# Patient Record
Sex: FEMALE | Race: WHITE
[De-identification: names, ages, dates, MRNs, and addresses within clinical notes are randomized per-mention and may not be internally consistent; named-entity substitution may affect disease eponyms.]

---

## 2020-08-05 ENCOUNTER — HOSPITAL ENCOUNTER (EMERGENCY)
Dept: HOSPITAL 46 - ED | Age: 70
Discharge: HOME | End: 2020-08-05
Payer: MEDICARE

## 2020-08-05 VITALS — HEIGHT: 68 IN | BODY MASS INDEX: 27.28 KG/M2 | WEIGHT: 180.01 LBS
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
## 2020-08-05 DIAGNOSIS — S81.011A: Primary | ICD-10-CM

## 2020-08-05 DIAGNOSIS — E10.9: ICD-10-CM

## 2020-08-05 DIAGNOSIS — Z23: ICD-10-CM

## 2020-08-05 DIAGNOSIS — Z88.8: ICD-10-CM

## 2020-08-05 DIAGNOSIS — E03.9: ICD-10-CM

## 2020-08-05 DIAGNOSIS — Z79.899: ICD-10-CM

## 2020-08-05 PROCEDURE — 0YQFXZZ REPAIR RIGHT KNEE REGION, EXTERNAL APPROACH: ICD-10-PCS | Performed by: EMERGENCY MEDICINE

## 2020-08-19 ENCOUNTER — HOSPITAL ENCOUNTER (EMERGENCY)
Dept: HOSPITAL 46 - ED | Age: 70
Discharge: HOME | End: 2020-08-19
Payer: MEDICARE

## 2020-08-19 VITALS — WEIGHT: 180.01 LBS | HEIGHT: 68 IN | BODY MASS INDEX: 27.28 KG/M2

## 2020-08-19 DIAGNOSIS — Z48.01: Primary | ICD-10-CM

## 2020-08-19 NOTE — XMS
PreManage Notification: INÉS HAGER MRN:[id number]
 
Security Information
 
Security Events
No recent Security Events currently on file
 
 
 
CRITERIA MET
------------
- [facility] - 2 Visits in 30 Days
 
 
CARE PROVIDERS
There are no care providers on record at this time.
 
[facility] has no Care Guidelines for this patient.
 
[facility]AL[facility] VISIT COUNT (12 MO.)
-------------------------------------------------------------------------------------
2 [facility]
-------------------------------------------------------------------------------------
TOTAL 2
-------------------------------------------------------------------------------------
NOTE: Visits indicate total known visits.
 
ED/C VISIT TRACKING (12 MO.)
-------------------------------------------------------------------------------------
08/19/2020 10:53
[facility] [place]
 
TYPE: Emergency
 
COMPLAINT:
- SUTURE REMOVAL
-------------------------------------------------------------------------------------
08/05/2020 07:57
[facility] [place]
 
TYPE: Emergency
 
COMPLAINT:
- FALL, RIGHT KNEE LAC
 
DIAGNOSES:
- Allergy status to other drugs, medicaments and biological sub
- Type 1 diabetes mellitus without complications
- Hypothyroidism, unspecified
- Other long term (current) drug therapy
- Encounter for immunization
- Laceration without foreign body, right knee, initial encounte
- Fall on same level from slipping, tripping and stumbling with
-------------------------------------------------------------------------------------
 
 
INPATIENT VISIT TRACKING (12 MO.)
No inpatient visits to display in this time frame
 
 
[email or web address]

## 2020-08-19 NOTE — XMS
Encounter Summary
  Created on: 2020
 
 Kassie Duckworth
 External Reference #: [id number]
 : 52
 Sex: Female
 
 Demographics
 
 
+-----------------------+------------------------+
| Address               | [address]         |
|                       | [place]  [address]    |
+-----------------------+------------------------+
| Home Phone            | [phone]        |
+-----------------------+------------------------+
| Preferred Language    | Unknown                |
+-----------------------+------------------------+
| Marital Status        |                 |
+-----------------------+------------------------+
| Hindu Affiliation | Unknown                |
+-----------------------+------------------------+
| Race                  | White                  |
+-----------------------+------------------------+
| Ethnic Group          | Not  or  |
+-----------------------+------------------------+
 
 
 Author
 
 
+--------------+------------------------------+
| Author       | [facility] |
+--------------+------------------------------+
| Organization | [facility] |
+--------------+------------------------------+
| Address      | Unknown                      |
+--------------+------------------------------+
| Phone        | Unavailable                  |
+--------------+------------------------------+
 
 
 
 Support
 
 
+-------------+--------------+---------+-----------------+
| Name        | Relationship | Address | Phone           |
+-------------+--------------+---------+-----------------+
| Abisai Duckworth | ECON         | Unknown | [phone] |
+-------------+--------------+---------+-----------------+
 
 
 
 Care Team Providers
 
 
 
+-----------------------+------+-------------+
| Care Team Member Name | Role | Phone       |
+-----------------------+------+-------------+
 PCP  | Unavailable |
+-----------------------+------+-------------+
 
 
 
 Encounter Details
 
 
+--------+-------------+----------------------+----------------------+-------------+
| Date   | Type        | Department           | Care Team            | Description |
+--------+-------------+----------------------+----------------------+-------------+
| / | Ancillary   |   Registration  [address] |   Feliz Kumar MD  |             |
|    | Registratio |  [facility] |  [address]   |             |
|        | n           |  Rd  Mailcode: RPB07 | [facility] [address]        |             |
|        |             |   [place], [place]       | [place] [address]  |             |
|        |             | [address]           |  [phone]        |             |
|        |             | [phone]         | [phone] (Fax)   |             |
+--------+-------------+----------------------+----------------------+-------------+
 
 
 
 Social History
 
 
+----------------+-------+-----------+--------+------+
| Tobacco Use    | Types | Packs/Day | Years  | Date |
|                |       |           | Used   |      |
+----------------+-------+-----------+--------+------+
| Never Assessed |       |           |        |      |
+----------------+-------+-----------+--------+------+
 
 
 
+------------------+---------------+
| Sex Assigned at  | Date Recorded |
| Birth            |               |
+------------------+---------------+
| Not on file      |               |
+------------------+---------------+
 
 
 
+----------------+-------------+-------------+
| Job Start Date | Occupation  | Industry    |
+----------------+-------------+-------------+
| Not on file    | Not on file | Not on file |
+----------------+-------------+-------------+
 
 
 
+----------------+--------------+------------+
| Travel History | Travel Start | Travel End |
+----------------+--------------+------------+
 
 
 
+-------------------------------------+
 
| No recent travel history available. |
+-------------------------------------+
 documented as of this encounter
 
 Plan of Treatment
 Not on filedocumented as of this encounter
 
 Visit Diagnoses
 Not on filedocumented in this encounter

## 2020-08-19 NOTE — XMS
Encounter Summary
  Created on: 2020
 
 Kassie Duckworth
 External Reference #: [id number]
 : 52
 Sex: Female
 
 Demographics
 
 
+-----------------------+------------------------+
| Address               | [address]         |
|                       | [place]  [address]    |
+-----------------------+------------------------+
| Home Phone            | [phone]        |
+-----------------------+------------------------+
| Preferred Language    | Unknown                |
+-----------------------+------------------------+
| Marital Status        |                 |
+-----------------------+------------------------+
| Buddhism Affiliation | Unknown                |
+-----------------------+------------------------+
| Race                  | White                  |
+-----------------------+------------------------+
| Ethnic Group          | Not  or  |
+-----------------------+------------------------+
 
 
 Author
 
 
+--------------+------------------------------+
| Author       | [facility] |
+--------------+------------------------------+
| Organization | [facility] |
+--------------+------------------------------+
| Address      | Unknown                      |
+--------------+------------------------------+
| Phone        | Unavailable                  |
+--------------+------------------------------+
 
 
 
 Support
 
 
+-------------+--------------+---------+-----------------+
| Name        | Relationship | Address | Phone           |
+-------------+--------------+---------+-----------------+
| Abisai Duckworth | ECON         | Unknown | [phone] |
+-------------+--------------+---------+-----------------+
 
 
 
 Care Team Providers
 
 
 
+-----------------------+------+-------------+
| Care Team Member Name | Role | Phone       |
+-----------------------+------+-------------+
 PCP  | Unavailable |
+-----------------------+------+-------------+
 
 
 
 Encounter Details
 
 
+--------+-------------+-------------------+----------------------+---------------+
| Date   | Type        | Department        | Care Team            | Description   |
+--------+-------------+-------------------+----------------------+---------------+
| / | Office      |   [facility] |   Note, Dermatology  | Progress Note |
| 2005   | Visit-Trans |                   | Clinic               |               |
|        | cribed      |                   |                      |               |
+--------+-------------+-------------------+----------------------+---------------+
 
 
 
 Social History
 
 
+----------------+-------+-----------+--------+------+
| Tobacco Use    | Types | Packs/Day | Years  | Date |
|                |       |           | Used   |      |
+----------------+-------+-----------+--------+------+
| Never Assessed |       |           |        |      |
+----------------+-------+-----------+--------+------+
 
 
 
+------------------+---------------+
| Sex Assigned at  | Date Recorded |
| Birth            |               |
+------------------+---------------+
| Not on file      |               |
+------------------+---------------+
 
 
 
+----------------+-------------+-------------+
| Job Start Date | Occupation  | Industry    |
+----------------+-------------+-------------+
| Not on file    | Not on file | Not on file |
+----------------+-------------+-------------+
 
 
 
+----------------+--------------+------------+
| Travel History | Travel Start | Travel End |
+----------------+--------------+------------+
 
 
 
+-------------------------------------+
| No recent travel history available. |
+-------------------------------------+
 documented as of this encounter
 
 
 Progress Notes
 Interface, Transcription In - 2005  5:06 AM PDT
      [id number]           2005 [id number]
         [id number]  MADAN CONTE
 
 DATE OF SERVICE:                 2005
 
 SURGEON: Feliz Kumar M.D.
 SURGICAL ASSISTANT: Laura Corcoran M.D.
 RESIDENT: Eusebio Jiménez M.D.
 
 ASSESSMENT: Taken and Recorded
 
 INDICATIONS: Patient presents with primary basal cell carcinoma of the left
 nose sidewall, pretreatment lesion size .8 x .8 cm. Because of the size,
 site, aggressive pathology, poorly defined borders, and diagnosis of the
 lesion, it is felt that it is best treated with Mohs micrographic surgery.
 Alternative surgical and nonsurgical therapies were discussed with the
 patient prior to obtaining written and verbal consent for Mohs surgery
 after explanation of risks of bleeding, infection, scarring, nerve damage
 and recurrence
 
 STAGE 1: The patient was placed supine on the operating room table. The
 wound was defined and infiltrated with lidocaine with epinephrine. The area
 was then debulked. Initial excisions were made around the clinical and
 debulk markings and hemostasis was obtained by electrodessication. A
 dressing was placed. Tissue was divided into 2 specimens, which were
 mapped, color-coded at their margins, and frozen sectioning was performed.
 Microscopic tumor was found persisting in 1 of the specimens. The histology
 showed a large atypical basaloid pleomorphic cells extending into the
 papillary and reticular dermis.
 
 STAGE II: The patient was returned to the operative suite. The area of
 positivity was delineated, infiltrated with lidocaine with epinephrine, and
 excised. Tissue was divided into 1 specimens, which were again marked,
 color-coded, and frozen sectioning was performed. Hemostasis was obtained
 in the usual manner, and a dressing placed. Microscopic tumors were found
 persisting in none of the specimens.
 
 With the lesion clear of microscopic tumor, surgery was considered
 complete. Following surgery, the defect measures as follows: 1.5 x 1.2 cm.
 
 FINAL DIAGNOSIS: Primary basal cell carcinoma of the left nose sidewall.
 
 CONDITION AT TERMINATION OF THERAPY: Carcinoma removed.
 
 COMMENTS: Advancement flap closure.
 
 ______________________________________
 _______________________________________
 ___                                    _
 Feliz Kumar M.D.                      Laura Corcoran M.D.
 Professor and Chair, Dermatology        Fellow, Dermatology
                                         Eusebio Jiménez M.D.
                                         Resident, Dermatology
 
 Pursuant to federal Medicare billing regulations, I certify that I was
 present, at a minimum, during the key components of the procedure: identify
 location/lesion and method of destruction. Identify the appropriate margin,
 
 respect for anatomy and reading, and prepared histologic session.
 
 NS/cary
 D: 2005
 T: 2005  9:57 A
 
 Electronically signed by Feliz Kumar 2005 02:45:28 PM
 Electronically signed by Paul Transcription In at 2005  5:06 AM PDTdocumented i
n this encounter
 
 Plan of Treatment
 Not on filedocumented as of this encounter
 
 Visit Diagnoses
 Not on filedocumented in this encounter

## 2020-08-19 NOTE — XMS
Encounter Summary
  Created on: 2020
 
 Kassie Duckworth
 External Reference #: [id number]
 : 52
 Sex: Female
 
 Demographics
 
 
+-----------------------+----------------------+
| Address               | [address]    |
|                       | [place]  [address] |
+-----------------------+----------------------+
| Home Phone            | [phone]      |
+-----------------------+----------------------+
| Preferred Language    | Unknown              |
+-----------------------+----------------------+
| Marital Status        |               |
+-----------------------+----------------------+
| [employer] Affiliation | 1077                 |
+-----------------------+----------------------+
| Race                  | Unknown              |
+-----------------------+----------------------+
| Ethnic Group          | Unknown              |
+-----------------------+----------------------+
 
 
 Author
 
 
+--------------+--------------------------------------------+
| Author       | [facility]  |
|              | and [place]                                |
+--------------+--------------------------------------------+
| Organization | [facility]  |
|              | and [place]                                |
+--------------+--------------------------------------------+
| Address      | Unknown                                    |
+--------------+--------------------------------------------+
| Phone        | Unavailable                                |
+--------------+--------------------------------------------+
 
 
 
 Support
 
 
+---------------+--------------+---------+-----------------+
| Name          | Relationship | Address | Phone           |
+---------------+--------------+---------+-----------------+
| Abisai Duckworth | ECON         | Unknown | [phone] |
+---------------+--------------+---------+-----------------+
 
 
 
 Care Team Providers
 
 
 
+-----------------------+------+-------------+
| Care Team Member Name | Role | Phone       |
+-----------------------+------+-------------+
 PCP  | Unavailable |
+-----------------------+------+-------------+
 
 
 
 Encounter Details
 
 
+--------+-----------+----------------------+-----------+-------------+
| Date   | Type      | Department           | Care Team | Description |
+--------+-----------+----------------------+-----------+-------------+
| / | Hospital  |   [facility] |           |             |
|    | Encounter |  [facility] GENER[facility] OP  |           |             |
|        |           | CONV DEPT  [address]     |           |             |
|        |           | [place], |           |             |
|        |           |  [place] [address]       |           |             |
|        |           | [phone]         |           |             |
+--------+-----------+----------------------+-----------+-------------+
 
 
 
 Social History
 
 
+----------------+-------+-----------+--------+------+
| Tobacco Use    | Types | Packs/Day | Years  | Date |
|                |       |           | Used   |      |
+----------------+-------+-----------+--------+------+
| Never Assessed |       |           |        |      |
+----------------+-------+-----------+--------+------+
 
 
 
+------------------+---------------+
| Sex Assigned at  | Date Recorded |
| Birth            |               |
+------------------+---------------+
| Not on file      |               |
+------------------+---------------+
 documented as of this encounter
 
 Plan of Treatment
 Not on filedocumented as of this encounter
 
 Visit Diagnoses
 Not on filedocumented in this encounter

## 2020-08-19 NOTE — XMS
Encounter Summary
  Created on: 2020
 
 Kassie Duckworth
 External Reference #: [id number]
 : 52
 Sex: Female
 
 Demographics
 
 
+-----------------------+----------------------+
| Address               | [address]    |
|                       | [place]  [address] |
+-----------------------+----------------------+
| Home Phone            | [phone]      |
+-----------------------+----------------------+
| Preferred Language    | Unknown              |
+-----------------------+----------------------+
| Marital Status        |               |
+-----------------------+----------------------+
| Mosque Affiliation | 1077                 |
+-----------------------+----------------------+
| Race                  | Unknown              |
+-----------------------+----------------------+
| Ethnic Group          | Unknown              |
+-----------------------+----------------------+
 
 
 Author
 
 
+--------------+--------------------------------------------+
| Author       | [facility] [place]  |
|              | and [place]                                |
+--------------+--------------------------------------------+
| Organization | [facility]  |
|              | and [place]                                |
+--------------+--------------------------------------------+
| Address      | Unknown                                    |
+--------------+--------------------------------------------+
| Phone        | Unavailable                                |
+--------------+--------------------------------------------+
 
 
 
 Support
 
 
+---------------+--------------+---------+-----------------+
| Name          | Relationship | Address | Phone           |
+---------------+--------------+---------+-----------------+
| Abisai Duckworth | ECON         | Unknown | [phone] |
+---------------+--------------+---------+-----------------+
 
 
 
 Care Team Providers
 
 
 
+-----------------------+------+-------------+
| Care Team Member Name | Role | Phone       |
+-----------------------+------+-------------+
 PCP  | Unavailable |
+-----------------------+------+-------------+
 
 
 
 Encounter Details
 
 
+--------+-----------+----------------------+----------------------+-------------+
| Date   | Type      | Department           | Care Team            | Description |
+--------+-----------+----------------------+----------------------+-------------+
| / | Hospital  |   [facility] |   Rhys Soni,  |             |
|  - | Encounter |  [facility]      | MD  [address]  |             |
|        |           | [facility]  [address]   |  [address]      |             |
| / |           | [place], | [place] [address]      |             |
|    |           |  [place] [address]       | [phone]         |             |
|        |           | [phone]         | [phone] (Fax)   |             |
+--------+-----------+----------------------+----------------------+-------------+
 
 
 
 Social History
 
 
+----------------+-------+-----------+--------+------+
| Tobacco Use    | Types | Packs/Day | Years  | Date |
|                |       |           | Used   |      |
+----------------+-------+-----------+--------+------+
| Never Assessed |       |           |        |      |
+----------------+-------+-----------+--------+------+
 
 
 
+------------------+---------------+
| Sex Assigned at  | Date Recorded |
| Birth            |               |
+------------------+---------------+
| Not on file      |               |
+------------------+---------------+
 documented as of this encounter
 
 Plan of Treatment
 Not on filedocumented as of this encounter
 
 Visit Diagnoses
 Not on filedocumented in this encounter

## 2020-08-19 NOTE — XMS
Encounter Summary
  Created on: 2020
 
 Kassie Duckworth
 External Reference #: [id number]
 : 52
 Sex: Female
 
 Demographics
 
 
+-----------------------+------------------------+
| Address               | [address]         |
|                       | [place]  [address]    |
+-----------------------+------------------------+
| Home Phone            | [phone]        |
+-----------------------+------------------------+
| Preferred Language    | Unknown                |
+-----------------------+------------------------+
| Marital Status        |                 |
+-----------------------+------------------------+
| Lutheran Affiliation | Unknown                |
+-----------------------+------------------------+
| Race                  | White                  |
+-----------------------+------------------------+
| Ethnic Group          | Not  or  |
+-----------------------+------------------------+
 
 
 Author
 
 
+--------------+------------------------------+
| Author       | [facility] |
+--------------+------------------------------+
| Organization | [facility] |
+--------------+------------------------------+
| Address      | Unknown                      |
+--------------+------------------------------+
| Phone        | Unavailable                  |
+--------------+------------------------------+
 
 
 
 Support
 
 
+-------------+--------------+---------+-----------------+
| Name        | Relationship | Address | Phone           |
+-------------+--------------+---------+-----------------+
| Abisai Duckworth | ECON         | Unknown | [phone] |
+-------------+--------------+---------+-----------------+
 
 
 
 Care Team Providers
 
 
 
+-----------------------+------+-------------+
| Care Team Member Name | Role | Phone       |
+-----------------------+------+-------------+
 PCP  | Unavailable |
+-----------------------+------+-------------+
 
 
 
 Encounter Details
 
 
+--------+-------------+-------------------+----------------------+---------------+
| Date   | Type        | Department        | Care Team            | Description   |
+--------+-------------+-------------------+----------------------+---------------+
| / | Office      |   [facility] |   Note, Dermatology  | Progress Note |
| 2005   | Visit-Trans |                   | Clinic               |               |
|        | cribed      |                   |                      |               |
+--------+-------------+-------------------+----------------------+---------------+
 
 
 
 Social History
 
 
+----------------+-------+-----------+--------+------+
| Tobacco Use    | Types | Packs/Day | Years  | Date |
|                |       |           | Used   |      |
+----------------+-------+-----------+--------+------+
| Never Assessed |       |           |        |      |
+----------------+-------+-----------+--------+------+
 
 
 
+------------------+---------------+
| Sex Assigned at  | Date Recorded |
| Birth            |               |
+------------------+---------------+
| Not on file      |               |
+------------------+---------------+
 
 
 
+----------------+-------------+-------------+
| Job Start Date | Occupation  | Industry    |
+----------------+-------------+-------------+
| Not on file    | Not on file | Not on file |
+----------------+-------------+-------------+
 
 
 
+----------------+--------------+------------+
| Travel History | Travel Start | Travel End |
+----------------+--------------+------------+
 
 
 
+-------------------------------------+
| No recent travel history available. |
+-------------------------------------+
 documented as of this encounter
 
 
 Progress Notes
 Interface, Transcription In - 2005  5:06 AM PDT
      [id number]           [id number]
         [id number]  MADAN CONTE
 
 DATE OF SERVICE:                 2005
 
 SURGEON:  Feliz Kumar M.D.
 SURGICAL ASSISTANT:  Laura Corcoran M.D.
 RESIDENT:   Eusebio Jiménez M.D.
 
 INDICATIONS:  The patient was left with a 1.5 x 1.2 cm defect involving the
 left nose sidewall following Mohs surgery for removal of a primary basal
 cell carcinoma. Various closure modalities were discussed with the patient,
 and it was decided that an advancement flap would best preserve normal
 anatomical and functional relationships. After a discussion of the risks of
 bleeding, scarring, infection, and wound dehiscense, written and verbal
 consent was obtained, and the patient underwent the procedure as follows.
 
 PROCEDURE:  The patient was taken to the operative suite and placed supine
 on the operating room table. The area was anesthetized with 1% lidocaine
 with epinephrine. The area was washed with Hibiclens, rinsed with saline,
 and draped with sterile towels. An advancement flap was designed with
 incisions made superiorly and laterally. The flap was widely undermined,
 and hemostasis was obtained with spot electrodessication. The flap was
 advanced to close the primary defect and sutured using fascia and 5-0
 polysorb sutures. The secondary defect was modified by tissue rearrangement
 removing Burrow's triangles. Once aligned, the dermis was carefully closed
 using 5-0 polysorb dermal sutures. The epidermis was carefully approximated
 throughout the length of the wound with 5-0 novafil and fast absorbing gut
 sutures. The final wound length was 3.0 x 2.0cm. A sterile pressure
 dressing was applied, and wound care instructions were given.
 
 FINAL DIAGNOSIS:  Primary basal cell carcinoma of the left nose sidewall.
 
 FINAL PROCEDURE: Advancement flap.
 
 COMPLICATIONS:  None.
 
 ______________________________________
 _______________________________________
 ___                                    _
 Feliz Kumar M.D.                      Laura Corcoran M.D.
 Professor and Chair, Dermatology        Fellow, Dermatology
                                         Eusebio Jiménez M.D.
                                         Resident, Dermatology
 
 Pursuant to federal Medicare billing regulations, I certify that I was
 present, at a minimum, during the key components of the procedure: identify
 type of reconstruction, area of primary and secondary tissue movement,
 design of flap, location of key stitch, and review of final result.
 
 NS/cary
 D: 2005
 T: 2005 10:04 A
 
 Electronically signed by Feliz Kumar 2005 02:45:39 PM
 Electronically signed by Paul, Transcription In at 2005  5:06 AM PDTdocumented i
n this encounter
 
 
 Plan of Treatment
 Not on filedocumented as of this encounter
 
 Visit Diagnoses
 Not on filedocumented in this encounter

## 2020-08-19 NOTE — XMS
Clinical Summary
  Created on: 2020
 
 Kassie Duckworth
 : 52
 Sex: Female
 
 Demographics
 
 
+-----------------------+------------------------+
| Address               | [address]         |
|                       | [place]  [address]    |
+-----------------------+------------------------+
| Home Phone            | [phone]        |
+-----------------------+------------------------+
| Preferred Language    | Unknown                |
+-----------------------+------------------------+
| Marital Status        |                 |
+-----------------------+------------------------+
| Buddhism Affiliation | Unknown                |
+-----------------------+------------------------+
| Race                  | White                  |
+-----------------------+------------------------+
| Ethnic Group          | Not  or  |
+-----------------------+------------------------+
 
 
 Author
 
 
+--------------+-------------+
| Organization | Unknown     |
+--------------+-------------+
| Address      | Unknown     |
+--------------+-------------+
| Phone        | Unavailable |
+--------------+-------------+
 
 
 
 Support
 
 
+-------------+--------------+---------+-----------------+
| Name        | Relationship | Address | Phone           |
+-------------+--------------+---------+-----------------+
| Abisai Duckworth | ECON         | Unknown | [phone] |
+-------------+--------------+---------+-----------------+
 
 
 
 Care Team Providers
 
 
+-----------------------+------+-------------+
| Care Team Member Name | Role | Phone       |
+-----------------------+------+-------------+
 
 PCP  | Unavailable |
+-----------------------+------+-------------+
 
 
 
 Source Comments
 BILLY is fully live on both EpicCare Ambulatory and EpicCare InPatient.[facility]
 
 Allergies
 Not on File
 
 Medications
 Not on file
 
 Active Problems
 Not on file
 
 Social History
 
 
+----------------+-------+-----------+--------+------+
| Tobacco Use    | Types | Packs/Day | Years  | Date |
|                |       |           | Used   |      |
+----------------+-------+-----------+--------+------+
| Never Assessed |       |           |        |      |
+----------------+-------+-----------+--------+------+
 
 
 
+------------------+---------------+
| Sex Assigned at  | Date Recorded |
| Birth            |               |
+------------------+---------------+
| Not on file      |               |
+------------------+---------------+
 
 
 
+----------------+-------------+-------------+
| Job Start Date | Occupation  | Industry    |
+----------------+-------------+-------------+
| Not on file    | Not on file | Not on file |
+----------------+-------------+-------------+
 
 
 
+----------------+--------------+------------+
| Travel History | Travel Start | Travel End |
+----------------+--------------+------------+
 
 
 
+-------------------------------------+
| No recent travel history available. |
+-------------------------------------+
 
 
 
 Last Filed Vital Signs
 
 Not on file
 
 Plan of Treatment
 
 
+----------------------+-----------+-----------+----------+
| Health Maintenance   | Due Date  | Last Done | Comments |
+----------------------+-----------+-----------+----------+
| Pneumococcal         |  |           |          |
| vaccination (1 of 2  | 6         |           |          |
| - PCV13)             |           |           |          |
+----------------------+-----------+-----------+----------+
| Influenza (Flu)      | 10/01/201 |           |          |
| vaccination (#1)     | 9         |           |          |
+----------------------+-----------+-----------+----------+
 
 
 
 Results
 Not on filefrom Last 3 Months

## 2020-08-19 NOTE — XMS
Encounter Summary
  Created on: 2020
 
 Kassie Duckworth
 External Reference #: [id number]
 : 52
 Sex: Female
 
 Demographics
 
 
+-----------------------+------------------------+
| Address               | [address]         |
|                       | [place]  [address]    |
+-----------------------+------------------------+
| Home Phone            | [phone]        |
+-----------------------+------------------------+
| Preferred Language    | Unknown                |
+-----------------------+------------------------+
| Marital Status        |                 |
+-----------------------+------------------------+
| Restoration Affiliation | Unknown                |
+-----------------------+------------------------+
| Race                  | White                  |
+-----------------------+------------------------+
| Ethnic Group          | Not  or  |
+-----------------------+------------------------+
 
 
 Author
 
 
+--------------+------------------------------+
| Author       | [facility] |
+--------------+------------------------------+
| Organization | [facility] |
+--------------+------------------------------+
| Address      | Unknown                      |
+--------------+------------------------------+
| Phone        | Unavailable                  |
+--------------+------------------------------+
 
 
 
 Support
 
 
+-------------+--------------+---------+-----------------+
| Name        | Relationship | Address | Phone           |
+-------------+--------------+---------+-----------------+
| Abisai Duckworth | ECON         | Unknown | [phone] |
+-------------+--------------+---------+-----------------+
 
 
 
 Care Team Providers
 
 
 
+-----------------------+------+-------------+
| Care Team Member Name | Role | Phone       |
+-----------------------+------+-------------+
 PCP  | Unavailable |
+-----------------------+------+-------------+
 
 
 
 Encounter Details
 
 
+--------+----------+----------------------+----------------------+-------------+
| Date   | Type     | Department           | Care Team            | Description |
+--------+----------+----------------------+----------------------+-------------+
| / | Results  |   Surgical           |   Feliz Kumar MD  |             |
|    | Only     | Dermatology  [address] |  [address]   |             |
|        |          |  [address]       | [address]  [address]        |             |
|        |          | Mailcode:OP06        | [place] [address]  |             |
|        |          | Outpatient Clinic    |  [phone]        |             |
|        |          | [facility], Room [address]  | [phone] (Fax)   |             |
|        |          |  [place], [place]        |                      |             |
|        |          | [address]           |                      |             |
|        |          | [phone]         |                      |             |
+--------+----------+----------------------+----------------------+-------------+
 
 
 
 Social History
 
 
+----------------+-------+-----------+--------+------+
| Tobacco Use    | Types | Packs/Day | Years  | Date |
|                |       |           | Used   |      |
+----------------+-------+-----------+--------+------+
| Never Assessed |       |           |        |      |
+----------------+-------+-----------+--------+------+
 
 
 
+------------------+---------------+
| Sex Assigned at  | Date Recorded |
| Birth            |               |
+------------------+---------------+
| Not on file      |               |
+------------------+---------------+
 
 
 
+----------------+-------------+-------------+
| Job Start Date | Occupation  | Industry    |
+----------------+-------------+-------------+
| Not on file    | Not on file | Not on file |
+----------------+-------------+-------------+
 
 
 
+----------------+--------------+------------+
| Travel History | Travel Start | Travel End |
+----------------+--------------+------------+
 
 
 
 
+-------------------------------------+
| No recent travel history available. |
+-------------------------------------+
 documented as of this encounter
 
 Plan of Treatment
 Not on filedocumented as of this encounter
 
 Procedures
 
 
+----------------------+--------+------------+----------------------+----------------------+
| Procedure Name       | Priori | Date/Time  | Associated Diagnosis | Comments             |
|                      | ty     |            |                      |                      |
+----------------------+--------+------------+----------------------+----------------------+
| DERMATOPATHOLOGY(CON | Routin | 2004 |                      |   Results for this   |
| SULT)                | e      |            |                      | procedure are in the |
|                      |        |            |                      |  results section.    |
+----------------------+--------+------------+----------------------+----------------------+
 documented in this encounter
 
 Results
 DERMATOPATHOLOGY(CONSULT) (2004)
 
+-------------+--------------------------+-----------+------------+--------------+
| Component   | Value                    | Ref Range | Performed  | Pathologist  |
|             |                          |           | At         | Signature    |
+-------------+--------------------------+-----------+------------+--------------+
| DERMATOPATH | SOURCE OF SPECIMEN:A     |           |            |              |
| (CONSULT)   | CONSULTATION CLINICAL    |           |            |              |
|             | DESCRIPTION:3mm punch,   |           |            |              |
|             | Lt. side nose; pearly    |           |            |              |
|             | papule; BCC Dear Feliz:   |           |            |              |
|             |   I agree with        |           |            |              |
|             | [place] regarding Kassie  |           |            |              |
|             | Duckworth's left nosebiopsy   |           |            |              |
|             | where there are          |           |            |              |
|             | aggregates of cells with |           |            |              |
|             |  hyperchromatic          |           |            |              |
|             | nuclei,scant cytoplasm   |           |            |              |
|             | and palisading of the    |           |            |              |
|             | peripheral nuclei.       |           |            |              |
|             | DIAGNOSIS:A (B):   BASAL |           |            |              |
|             |  CELL CARCINOMA The      |           |            |              |
|             | basal cell carcinoma     |           |            |              |
|             | extends closely to the   |           |            |              |
|             | surgical margins. Thank  |           |            |              |
|             | you for referring this   |           |            |              |
|             | consultation.1 slide     |           |            |              |
|             | [id number] returned to  |           |            |              |
|             | Dr. Kumar. CRW:mm cc:  |           |            |              |
|             |   Yony Arias,     |           |            |              |
|             | [facility] [place] |           |            |              |
|             |  Clinic         [address]   |           |            |              |
|             | [address]        |           |            |              |
|             |   [place]        |           |            |              |
|             |   [address]                  |           |            |              |
|             | 04Rendering        |           |            |              |
 
|             | Diagnostician:   Claudio |           |            |              |
|             |  ASHA Rivera Jr.,           |           |            |              |
|             | M.D.PathologistElectroni |           |            |              |
|             | gaby Signed             |           |            |              |
|             | 2005Comment:       |           |            |              |
|             | SOURCE OF SPECIMEN:      |           |            |              |
|             | CONSULTATION             |           |            |              |
+-------------+--------------------------+-----------+------------+--------------+
 
 
 
+----------+
| Specimen |
+----------+
|          |
+----------+
 
 
 
+------------------+--------------------------+----------------------+--------------+
| Performing       | Address                  | City/State/Zipcode   | Phone Number |
| Organization     |                          |                      |              |
+------------------+--------------------------+----------------------+--------------+
|   [facility]           |   Mailcode [address]  [address]  |   [place] [address] |              |
| DERMATOPATHOLOGY | [address]              |                      |              |
+------------------+--------------------------+----------------------+--------------+
 documented in this encounter
 
 Visit Diagnoses
 Not on filedocumented in this encounter

## 2020-08-19 NOTE — XMS
Encounter Summary
  Created on: 2020
 
 Kassie Duckworth
 External Reference #: [id number]
 : 52
 Sex: Female
 
 Demographics
 
 
+-----------------------+----------------------+
| Address               | [address]    |
|                       | [place]  [address] |
+-----------------------+----------------------+
| Home Phone            | [phone]      |
+-----------------------+----------------------+
| Preferred Language    | Unknown              |
+-----------------------+----------------------+
| Marital Status        |               |
+-----------------------+----------------------+
| Baptism Affiliation | [id number]                 |
+-----------------------+----------------------+
| Race                  | Unknown              |
+-----------------------+----------------------+
| Ethnic Group          | Unknown              |
+-----------------------+----------------------+
 
 
 Author
 
 
+--------------+--------------------------------------------+
| Author       | [facility]  |
|              | [facility] [place]                                |
+--------------+--------------------------------------------+
| Organization | [facility]  |
|              | and [place]                                |
+--------------+--------------------------------------------+
| Address      | Unknown                                    |
+--------------+--------------------------------------------+
| Phone        | Unavailable                                |
+--------------+--------------------------------------------+
 
 
 
 Support
 
 
+---------------+--------------+---------+-----------------+
| Name          | Relationship | Address | Phone           |
+---------------+--------------+---------+-----------------+
| Abisai Duckworth | ECON         | Unknown | [phone] |
+---------------+--------------+---------+-----------------+
 
 
 
 Care Team Providers
 
 
 
+-----------------------+------+-------------+
| Care Team Member Name | Role | Phone       |
+-----------------------+------+-------------+
 PCP  | Unavailable |
+-----------------------+------+-------------+
 
 
 
 Encounter Details
 
 
+--------+-------------+----------------------+--------------------+-------------+
| Date   | Type        | Department           | Care Team          | Description |
+--------+-------------+----------------------+--------------------+-------------+
| / | Orders Only |   [facility]     |   Conversion       |             |
| 2018   |             | CONVERSION DEP  [address]  | Transaction,       |             |
|        |             | [address]           | Provider Unknown   |             |
|        |             | [place]         |        |             |
|        |             | [phone]           |  (Fax) |             |
|        |             | [phone]         |                    |             |
+--------+-------------+----------------------+--------------------+-------------+
 
 
 
 Social History
 
 
+--------------+-------+-----------+--------+------+
| Tobacco Use  | Types | Packs/Day | Years  | Date |
|              |       |           | Used   |      |
+--------------+-------+-----------+--------+------+
| Never Smoker |       |           |        |      |
+--------------+-------+-----------+--------+------+
 
 
 
+------------------+---------------+
| Sex Assigned at  | Date Recorded |
| Birth            |               |
+------------------+---------------+
| Not on file      |               |
+------------------+---------------+
 documented as of this encounter
 
 Plan of Treatment
 Not on filedocumented as of this encounter
 
 Visit Diagnoses
 Not on filedocumented in this encounter

## 2020-08-19 NOTE — XMS
Encounter Summary
  Created on: 2020
 
 Inés Duckworth
 External Reference #: [id number]
 : 52
 Sex: Female
 
 Demographics
 
 
+-----------------------+----------------------+
| Address               | [address]    |
|                       | [place]  [address] |
+-----------------------+----------------------+
| Home Phone            | [phone]      |
+-----------------------+----------------------+
| Preferred Language    | Unknown              |
+-----------------------+----------------------+
| Marital Status        |               |
+-----------------------+----------------------+
| Roman Catholic Affiliation | [id number]                 |
+-----------------------+----------------------+
| Race                  | Unknown              |
+-----------------------+----------------------+
| Ethnic Group          | Unknown              |
+-----------------------+----------------------+
 
 
 Author
 
 
+--------------+--------------------------------------------+
| Author       | [facility]  |
|              | and [place]                                |
+--------------+--------------------------------------------+
| Organization | [facility]  |
|              | and [place]                                |
+--------------+--------------------------------------------+
| Address      | Unknown                                    |
+--------------+--------------------------------------------+
| Phone        | Unavailable                                |
+--------------+--------------------------------------------+
 
 
 
 Support
 
 
+---------------+--------------+---------+-----------------+
| Name          | Relationship | Address | Phone           |
+---------------+--------------+---------+-----------------+
| Abisai Duckworth | ECON         | Unknown | [phone] |
+---------------+--------------+---------+-----------------+
 
 
 
 Care Team Providers
 
 
 
+-----------------------+------+-------------+
| Care Team Member Name | Role | Phone       |
+-----------------------+------+-------------+
 PCP  | Unavailable |
+-----------------------+------+-------------+
 
 
 
 Encounter Details
 
 
+--------+-----------+----------------------+-----------+-------------+
| Date   | Type      | Department           | Care Team | Description |
+--------+-----------+----------------------+-----------+-------------+
| / | Hospital  |   [facility]    |           |             |
|    | Encounter | [facility]       |           |             |
|        |           | CLINICAL LABORATORY  |           |             |
|        |           |  [address]      |           |             |
|        |           | [place], [place]         |           |             |
|        |           | [address]           |           |             |
|        |           | [phone]         |           |             |
+--------+-----------+----------------------+-----------+-------------+
 
 
 
 Social History
 
 
+----------------+-------+-----------+--------+------+
| Tobacco Use    | Types | Packs/Day | Years  | Date |
|                |       |           | Used   |      |
+----------------+-------+-----------+--------+------+
| Never Assessed |       |           |        |      |
+----------------+-------+-----------+--------+------+
 
 
 
+------------------+---------------+
| Sex Assigned at  | Date Recorded |
| Birth            |               |
+------------------+---------------+
| Not on file      |               |
+------------------+---------------+
 documented as of this encounter
 
 Plan of Treatment
 Not on filedocumented as of this encounter
 
 Procedures
 
 
+---------------------+--------+-------------+----------------------+----------------------+
| Procedure Name      | Priori | Date/Time   | Associated Diagnosis | Comments             |
|                     | ty     |             |                      |                      |
+---------------------+--------+-------------+----------------------+----------------------+
| TISSUE REQUEST FOR  | Routin | 2012  |                      |   Results for this   |
| PATHOLOGY (NON-ORD) | e      |  2:04 PM    |                      | procedure are in the |
|                     |        | PDT         |                      |  results section.    |
 
+---------------------+--------+-------------+----------------------+----------------------+
 documented in this encounter
 
 Results
 Tissue Request For Pathology (2012  2:04 PM PDT)
 
+----------+
| Specimen |
+----------+
|          |
+----------+
 
 
 
+------------------------------------------------------------------------+----------------+
| Narrative                                                              | Performed At   |
+------------------------------------------------------------------------+----------------+
|   CASE: [id number]  PATIENT: INSÉ DUCKWORTH   Surgical Pathology Report  |   EXTERNAL LAB |
|        PATHOLOGIC DIAGNOSIS:     SKIN, LEFT INDEX FINGER:  -           |                |
|   VERRUCA VULGARIS.  -      NEGATIVE FOR CARCINOMA.     BES:kmk:C2NR   |                |
|          CLINICAL HISTORY:   2012. Mass - left index finger.     |                |
|  GROSS DESCRIPTION:     The specimen is received in formalin labeled   |                |
| "Inés Duckworth" and  designated "Mass - left index finger" consists of |                |
|  a 0.9 x 0.8 x 0.6 cm  unoriented skin excision. The skin surface is   |                |
| white-tan and slightly  roughened. The specimen is inked, trisected    |                |
| and entirely submitted in  cassette A1.  fam:[facility]           MICROSCOPIC |                |
|  EXAMINATION:     Histologic sections of all submitted blocks are      |                |
| examined by light  microscopy. These findings, together with the gross |                |
|  examination, support  the pathologic diagnosis.              [facility]  |                |
| Samir KAY  Electronically signed Mar 13, 2012 2:04:06PM                |                |
+------------------------------------------------------------------------+----------------+
 
 
 
+----------------+---------+--------------------+--------------+
| Performing     | Address | City/State/Zipcode | Phone Number |
| Organization   |         |                    |              |
+----------------+---------+--------------------+--------------+
|   EXTERNAL LAB |         |                    |              |
+----------------+---------+--------------------+--------------+
 documented in this encounter
 
 Visit Diagnoses
 Not on filedocumented in this encounter

## 2020-08-19 NOTE — XMS
Clinical Summary
  Created on: 2020
 
 Kassie Duckworth
 External Reference #: [id number]
 : 52
 Sex: Female
 
 Demographics
 
 
+-----------------------+----------------------+
| Address               | [address]    |
|                       | [place]  [address] |
+-----------------------+----------------------+
| Home Phone            | [phone]      |
+-----------------------+----------------------+
| Preferred Language    | Unknown              |
+-----------------------+----------------------+
| Marital Status        |               |
+-----------------------+----------------------+
| Scientology Affiliation | [id number]                 |
+-----------------------+----------------------+
| Race                  | Unknown              |
+-----------------------+----------------------+
| Ethnic Group          | Unknown              |
+-----------------------+----------------------+
 
 
 Author
 
 
+--------------+--------------------------------------------+
| Author       | [facility] [place]  |
|              | and [place]                                |
+--------------+--------------------------------------------+
| Organization | [facility] [place]  |
|              | and [place]                                |
+--------------+--------------------------------------------+
| Address      | Unknown                                    |
+--------------+--------------------------------------------+
| Phone        | Unavailable                                |
+--------------+--------------------------------------------+
 
 
 
 Support
 
 
+---------------+--------------+---------+-----------------+
| Name          | Relationship | Address | Phone           |
+---------------+--------------+---------+-----------------+
| Abisai Duckworth | ECON         | Unknown | [phone] |
+---------------+--------------+---------+-----------------+
 
 
 
 Care Team Providers
 
 
 
+-----------------------+------+-------------+
| Care Team Member Name | Role | Phone       |
+-----------------------+------+-------------+
 PCP  | Unavailable |
+-----------------------+------+-------------+
 
 
 
 Allergies
 No Known Allergies
 
 Medications
 
 
+----------------------+----------------------+-----------+---------+------+------+-------+
| Medication           | Sig                  | Dispensed | Refills | Star | End  | Statu |
|                      |                      |           |         | t    | Date | s     |
|                      |                      |           |         | Date |      |       |
+----------------------+----------------------+-----------+---------+------+------+-------+
|   insulin aspart     | 10 mLs by Other      |           | 0       | 03/0 |      | Activ |
| (NOVOLOG) 100        | route as needed for  |           |         | 1/20 |      | e     |
| units/mL injection   | High Blood Sugar.    |           |         | 18   |      |       |
|                      | For drug pump refill |           |         |      |      |       |
+----------------------+----------------------+-----------+---------+------+------+-------+
|   levothyroxine      | Take 137 mcg by      |           | 0       | 03/0 |      | Activ |
| (SYNTHROID) 137 MCG  | mouth every morning  |           |         | 1/20 |      | e     |
| tablet               | before breakfast.    |           |         | 18   |      |       |
+----------------------+----------------------+-----------+---------+------+------+-------+
|   FLUoxetine         | Take 10 mg by mouth  |           | 0       | 03/0 |      | Activ |
| (PROZAC) 10 mg       | daily.               |           |         | 1/20 |      | e     |
| capsule              |                      |           |         | 18   |      |       |
+----------------------+----------------------+-----------+---------+------+------+-------+
|   fish oil 1,000 mg  | Take 1 g by mouth    |           | 0       | 03/0 |      | Activ |
| capsule              | daily.               |           |         | 1/20 |      | e     |
|                      |                      |           |         | 18   |      |       |
+----------------------+----------------------+-----------+---------+------+------+-------+
|   Multiple           | Take 1 tablet by     |           | 0       | 03/0 |      | Activ |
| Vitamins-Minerals    | mouth daily.         |           |         | 1/20 |      | e     |
| (MULTIVITAMIN WITH   |                      |           |         | 18   |      |       |
| MINERALS) tablet     |                      |           |         |      |      |       |
+----------------------+----------------------+-----------+---------+------+------+-------+
|   metFORMIN          | Take 1,000 mg by     |           | 0       | 03/0 |      | Activ |
| (GLUCOPHAGE) 1000 MG | mouth 2 (two) times  |           |         | 1/20 |      | e     |
|  tablet              | daily with meals.    |           |         | 18   |      |       |
+----------------------+----------------------+-----------+---------+------+------+-------+
 
 
 
 Active Problems
 Not on file
 
 Family History
 
 
+-----------------+-----------+------+----------+
| Medical History | Relation  | Name | Comments |
+-----------------+-----------+------+----------+
| Diabetes, IDDM  | Maternal  |      |          |
 
|                 | Grandfath |      |          |
|                 | er        |      |          |
+-----------------+-----------+------+----------+
| Cancer          | Mother    |      |          |
+-----------------+-----------+------+----------+
| Cancer          | Sister    |      |          |
+-----------------+-----------+------+----------+
 
 
 
+----------------------+------+--------+----------+
| Relation             | Name | Status | Comments |
+----------------------+------+--------+----------+
| Maternal Grandfather |      |        |          |
+----------------------+------+--------+----------+
| Maternal Grandfather |      |        |          |
+----------------------+------+--------+----------+
| Mother               |      |        |          |
+----------------------+------+--------+----------+
| Mother               |      |        |          |
+----------------------+------+--------+----------+
| Sister               |      |        |          |
+----------------------+------+--------+----------+
| Sister               |      |        |          |
+----------------------+------+--------+----------+
 
 
 
 Social History
 
 
+--------------+-------+-----------+--------+------+
| Tobacco Use  | Types | Packs/Day | Years  | Date |
|              |       |           | Used   |      |
+--------------+-------+-----------+--------+------+
| Never Smoker |       |           |        |      |
+--------------+-------+-----------+--------+------+
 
 
 
+------------------+---------------+
| Sex Assigned at  | Date Recorded |
| Birth            |               |
+------------------+---------------+
| Not on file      |               |
+------------------+---------------+
 
 
 
 Last Filed Vital Signs
 Not on file
 
 Plan of Treatment
 
 
+----------------------+-----------+-------+----------+
| Health Maintenance   | Due Date  | Last  | Comments |
|                      |           | Done  |          |
+----------------------+-----------+-------+----------+
| Vaccine:             |  |       |          |
 
| Dtap/Tdap/Td (1 -    | 0         |       |          |
| Tdap)                |           |       |          |
+----------------------+-----------+-------+----------+
| Vaccine: Zoster (1   |  |       |          |
| of 2)                | 1         |       |          |
+----------------------+-----------+-------+----------+
| Breast Cancer        |  |       |          |
| Screening            | 6         |       |          |
+----------------------+-----------+-------+----------+
| Vaccine:             |  |       |          |
| Pneumococcal 65+ (1  | 6         |       |          |
| of 1 - PPSV23)       |           |       |          |
+----------------------+-----------+-------+----------+
| Vaccine: Influenza   |  |       |          |
| (#1)                 | 0         |       |          |
+----------------------+-----------+-------+----------+
 
 
 
 Results
 Not on filefrom Last 3 Months

## 2020-08-19 NOTE — XMS
Clinical Summary
  Created on: 2020
 
 Kassie Duckworth
 : 52
 Sex: Female
 
 Demographics
 
 
+-----------------------+------------------------+
| Address               | [address]         |
|                       | [place]  [address]    |
+-----------------------+------------------------+
| Home Phone            | [phone]        |
+-----------------------+------------------------+
| Preferred Language    | Unknown                |
+-----------------------+------------------------+
| Marital Status        |                 |
+-----------------------+------------------------+
| Alevism Affiliation | Unknown                |
+-----------------------+------------------------+
| Race                  | White                  |
+-----------------------+------------------------+
| Ethnic Group          | Not  or  |
+-----------------------+------------------------+
 
 
 Author
 
 
+--------------+-------------+
| Organization | Unknown     |
+--------------+-------------+
| Address      | Unknown     |
+--------------+-------------+
| Phone        | Unavailable |
+--------------+-------------+
 
 
 
 Support
 
 
+-------------+--------------+---------+-----------------+
| Name        | Relationship | Address | Phone           |
+-------------+--------------+---------+-----------------+
| Abisai Duckworth | ECON         | Unknown | [phone] |
+-------------+--------------+---------+-----------------+
 
 
 
 Care Team Providers
 
 
+-----------------------+------+-------------+
| Care Team Member Name | Role | Phone       |
+-----------------------+------+-------------+
 
 PCP  | Unavailable |
+-----------------------+------+-------------+
 
 
 
 Source Comments
 BILLY is fully live on both EpicCare Ambulatory and EpicCare InPatient.[facility]
 
 Allergies
 Not on File
 
 Medications
 Not on file
 
 Active Problems
 Not on file
 
 Social History
 
 
+----------------+-------+-----------+--------+------+
| Tobacco Use    | Types | Packs/Day | Years  | Date |
|                |       |           | Used   |      |
+----------------+-------+-----------+--------+------+
| Never Assessed |       |           |        |      |
+----------------+-------+-----------+--------+------+
 
 
 
+------------------+---------------+
| Sex Assigned at  | Date Recorded |
| Birth            |               |
+------------------+---------------+
| Not on file      |               |
+------------------+---------------+
 
 
 
+----------------+-------------+-------------+
| Job Start Date | Occupation  | Industry    |
+----------------+-------------+-------------+
| Not on file    | Not on file | Not on file |
+----------------+-------------+-------------+
 
 
 
+----------------+--------------+------------+
| Travel History | Travel Start | Travel End |
+----------------+--------------+------------+
 
 
 
+-------------------------------------+
| No recent travel history available. |
+-------------------------------------+
 
 
 
 Last Filed Vital Signs
 
 Not on file
 
 Plan of Treatment
 
 
+----------------------+-----------+-----------+----------+
| Health Maintenance   | Due Date  | Last Done | Comments |
+----------------------+-----------+-----------+----------+
| Pneumococcal         |  |           |          |
| vaccination (1 of 2  | 6         |           |          |
| - PCV13)             |           |           |          |
+----------------------+-----------+-----------+----------+
| Influenza (Flu)      | 10/01/201 |           |          |
| vaccination (#1)     | 9         |           |          |
+----------------------+-----------+-----------+----------+
 
 
 
 Results
 Not on filefrom Last 3 Months

## 2020-08-19 NOTE — XMS
Encounter Summary
  Created on: 2020
 
 Kassie Duckworth
 External Reference #: [id number]
 : 52
 Sex: Female
 
 Demographics
 
 
+-----------------------+------------------------+
| Address               | [address]         |
|                       | [place]  [address]    |
+-----------------------+------------------------+
| Home Phone            | [phone]        |
+-----------------------+------------------------+
| Preferred Language    | Unknown                |
+-----------------------+------------------------+
| Marital Status        |                 |
+-----------------------+------------------------+
| Confucianism Affiliation | Unknown                |
+-----------------------+------------------------+
| Race                  | White                  |
+-----------------------+------------------------+
| Ethnic Group          | Not  or  |
+-----------------------+------------------------+
 
 
 Author
 
 
+--------------+------------------------------+
| Author       | [facility] |
+--------------+------------------------------+
| Organization | [facility] |
+--------------+------------------------------+
| Address      | Unknown                      |
+--------------+------------------------------+
| Phone        | Unavailable                  |
+--------------+------------------------------+
 
 
 
 Support
 
 
+-------------+--------------+---------+-----------------+
| Name        | Relationship | Address | Phone           |
+-------------+--------------+---------+-----------------+
| Abisai Duckworth | ECON         | Unknown | [phone] |
+-------------+--------------+---------+-----------------+
 
 
 
 Care Team Providers
 
 
 
+-----------------------+------+-------------+
| Care Team Member Name | Role | Phone       |
+-----------------------+------+-------------+
 PCP  | Unavailable |
+-----------------------+------+-------------+
 
 
 
 Encounter Details
 
 
+--------+-------------+-------------------+-------------+-------------+
| Date   | Type        | Department        | Care Team   | Description |
+--------+-------------+-------------------+-------------+-------------+
| / | [facility] |   [facility] |   Letter,   | Letters     |
|    | scribed     |                   | Dermatology |             |
+--------+-------------+-------------------+-------------+-------------+
 
 
 
 Social History
 
 
+----------------+-------+-----------+--------+------+
| Tobacco Use    | Types | Packs/Day | Years  | Date |
|                |       |           | Used   |      |
+----------------+-------+-----------+--------+------+
| Never Assessed |       |           |        |      |
+----------------+-------+-----------+--------+------+
 
 
 
+------------------+---------------+
| Sex Assigned at  | Date Recorded |
| Birth            |               |
+------------------+---------------+
| Not on file      |               |
+------------------+---------------+
 
 
 
+----------------+-------------+-------------+
| Job Start Date | Occupation  | Industry    |
+----------------+-------------+-------------+
| Not on file    | Not on file | Not on file |
+----------------+-------------+-------------+
 
 
 
+----------------+--------------+------------+
| Travel History | Travel Start | Travel End |
+----------------+--------------+------------+
 
 
 
+-------------------------------------+
| No recent travel history available. |
+-------------------------------------+
 documented as of this encounter
 
 
 Progress Notes
 Interface, Transcription In - 2005  1:10 AM PDT
      [id number]           2004                     [id number]
         [id number]  MADAN CONTE
 
                     2005
 
                     Yony Arias M.D.
                     [address] [place] [address]  [id number]
 
                     Re: Kassie Duckworth
                      [id number]
 
                     Dear Dr. Arias,
 
                     Thank you for referring Kassie Nelson regarding the
                     primary basal cell carcinoma located on her left nose
                     sidewall. The tumor has now been removed using Mohs
                     micrographic surgery. The resultant defect was repaired
                     using an advancement flap closure. Copies of our
                     operative reports are enclosed for your records. They
                     should be self-explanatory. The patient will be
                     returning in one week for suture removal and surgical
                     follow up.
 
                     Sincerely,
 
                     Feliz Kumar M.D.
                     Professor and Chair, Dermatology
 
 NS/cary
 D: 2005
 T: 2005 10:08 A
 
 Electronically signed by Interface, Transcription In at 2005  1:10 AM PDTdocumented i
n this encounter
 
 Plan of Treatment
 Not on filedocumented as of this encounter
 
 Visit Diagnoses
 Not on filedocumented in this encounter

## 2020-08-19 NOTE — XMS
Encounter Summary
  Created on: 2020
 
 Kassie Duckworth
 External Reference #: [id number]
 : 52
 Sex: Female
 
 Demographics
 
 
+-----------------------+----------------------+
| Address               | [address]    |
|                       | [place]  [address] |
+-----------------------+----------------------+
| Home Phone            | [phone]      |
+-----------------------+----------------------+
| Preferred Language    | Unknown              |
+-----------------------+----------------------+
| Marital Status        |               |
+-----------------------+----------------------+
| Moravian Affiliation | 1077                 |
+-----------------------+----------------------+
| Race                  | Unknown              |
+-----------------------+----------------------+
| Ethnic Group          | Unknown              |
+-----------------------+----------------------+
 
 
 Author
 
 
+--------------+--------------------------------------------+
| Author       | [facility]  |
|              | and [place]                                |
+--------------+--------------------------------------------+
| Organization | [facility] [place]  |
|              | and [place]                                |
+--------------+--------------------------------------------+
| Address      | Unknown                                    |
+--------------+--------------------------------------------+
| Phone        | Unavailable                                |
+--------------+--------------------------------------------+
 
 
 
 Support
 
 
+---------------+--------------+---------+-----------------+
| Name          | Relationship | Address | Phone           |
+---------------+--------------+---------+-----------------+
| Abisai Duckworth | ECON         | Unknown | [phone] |
+---------------+--------------+---------+-----------------+
 
 
 
 Care Team Providers
 
 
 
+-----------------------+------+-------------+
| Care Team Member Name | Role | Phone       |
+-----------------------+------+-------------+
 PCP  | Unavailable |
+-----------------------+------+-------------+
 
 
 
 Encounter Details
 
 
+--------+-------------+----------------------+----------------------+-------------+
| Date   | Type        | Department           | Care Team            | Description |
+--------+-------------+----------------------+----------------------+-------------+
| / | Orders Only |   [facility]      |   Garry Lepe T, |             |
|    |             | [address] |  MD  [address]  |             |
|        |             |  [address]           |  [place] [address]  |             |
|        |             | [place], [place]         |  [phone]        |             |
|        |             | [address]           | [phone] (Fax)   |             |
|        |             | [phone]         |                      |             |
+--------+-------------+----------------------+----------------------+-------------+
 
 
 
 Social History
 
 
+--------------+-------+-----------+--------+------+
| Tobacco Use  | Types | Packs/Day | Years  | Date |
|              |       |           | Used   |      |
+--------------+-------+-----------+--------+------+
| Never Smoker |       |           |        |      |
+--------------+-------+-----------+--------+------+
 
 
 
+------------------+---------------+
| Sex Assigned at  | Date Recorded |
| Birth            |               |
+------------------+---------------+
| Not on file      |               |
+------------------+---------------+
 documented as of this encounter
 
 Plan of Treatment
 Not on filedocumented as of this encounter
 
 Procedures
 
 
+----------------------+--------+-------------+----------------------+----------------------
+
| Procedure Name       | Priori | Date/Time   | Associated Diagnosis | Comments             
|
|                      | ty     |             |                      |                      
|
+----------------------+--------+-------------+----------------------+----------------------
+
 
| XR KNEE BILATERAL AP | Routin | 2018  |                      |   Results for this   
|
|  STANDING            | e      |  1:45 PM    |                      | procedure are in the 
|
|                      |        | PST         |                      |  results section.    
|
+----------------------+--------+-------------+----------------------+----------------------
+
 documented in this encounter
 
 Results
 XR Knee Bilateral AP Standing (2018  1:45 PM PST)
 
+----------+
| Specimen |
+----------+
|          |
+----------+
 
 
 
+------------------------------------------------------------------------+--------------+
| Narrative                                                              | Performed At |
+------------------------------------------------------------------------+--------------+
|   Impression: Single view of bilateral knees while standing shows mild |              |
|   medial compartment narrowing of the right knee of 30 percent.        |              |
| Without  significant marginal osteophytes or other bony abnormality.   |              |
| Right knee is  largely symmetric with the left.                        |              |
+------------------------------------------------------------------------+--------------+
 
 
 
+-----------------------------------------------------------------------------------------+
| Procedure Note                                                                          |
+-----------------------------------------------------------------------------------------+
|   Matias Holt Conversion - 2019  4:03 PM PDT  Impression: Single view of bilateral   |
| knees while standing shows mildmedial compartment narrowing of the right knee of 30     |
| percent. Withoutsignificant marginal osteophytes or other bony abnormality. Right knee  |
| islargely symmetric with the left.                                                      |
+-----------------------------------------------------------------------------------------+
 documented in this encounter
 
 Visit Diagnoses
 Not on filedocumented in this encounter

## 2020-08-19 NOTE — XMS
Encounter Summary
  Created on: 2020
 
 Kassei Duckworth
 External Reference #: [id number]
 : 52
 Sex: Female
 
 Demographics
 
 
+-----------------------+------------------------+
| Address               | [address]         |
|                       | [place]  [address]    |
+-----------------------+------------------------+
| Home Phone            | [phone]        |
+-----------------------+------------------------+
| Preferred Language    | Unknown                |
+-----------------------+------------------------+
| Marital Status        |                 |
+-----------------------+------------------------+
| Gnosticism Affiliation | Unknown                |
+-----------------------+------------------------+
| Race                  | White                  |
+-----------------------+------------------------+
| Ethnic Group          | Not  or  |
+-----------------------+------------------------+
 
 
 Author
 
 
+--------------+------------------------------+
| Author       | [facility] |
+--------------+------------------------------+
| Organization | [facility] |
+--------------+------------------------------+
| Address      | Unknown                      |
+--------------+------------------------------+
| Phone        | Unavailable                  |
+--------------+------------------------------+
 
 
 
 Support
 
 
+-------------+--------------+---------+-----------------+
| Name        | Relationship | Address | Phone           |
+-------------+--------------+---------+-----------------+
| Abisai Duckworth | ECON         | Unknown | [phone] |
+-------------+--------------+---------+-----------------+
 
 
 
 Care Team Providers
 
 
 
+-----------------------+------+-------------+
| Care Team Member Name | Role | Phone       |
+-----------------------+------+-------------+
 PCP  | Unavailable |
+-----------------------+------+-------------+
 
 
 
 Encounter Details
 
 
+--------+-------------+----------------------+----------------------+-------------+
| Date   | Type        | Department           | Care Team            | Description |
+--------+-------------+----------------------+----------------------+-------------+
| / | Ancillary   |   Registration  [address] |   Feliz Kumar MD  |             |
|    | Registratio |  [facility] |  [address]   |             |
|        | n           |  [address]  Mailcode: [address] | [facility] [address]        |             |
|        |             |   [place], [place]       | [place] [address]  |             |
|        |             | [address]           |  [phone]        |             |
|        |             | [phone]         | [phone] (Fax)   |             |
+--------+-------------+----------------------+----------------------+-------------+
 
 
 
 Social History
 
 
+----------------+-------+-----------+--------+------+
| Tobacco Use    | Types | Packs/Day | Years  | Date |
|                |       |           | Used   |      |
+----------------+-------+-----------+--------+------+
| Never Assessed |       |           |        |      |
+----------------+-------+-----------+--------+------+
 
 
 
+------------------+---------------+
| Sex Assigned at  | Date Recorded |
| Birth            |               |
+------------------+---------------+
| Not on file      |               |
+------------------+---------------+
 
 
 
+----------------+-------------+-------------+
| Job Start Date | Occupation  | Industry    |
+----------------+-------------+-------------+
| Not on file    | Not on file | Not on file |
+----------------+-------------+-------------+
 
 
 
+----------------+--------------+------------+
| Travel History | Travel Start | Travel End |
+----------------+--------------+------------+
 
 
 
+-------------------------------------+
 
| No recent travel history available. |
+-------------------------------------+
 documented as of this encounter
 
 Plan of Treatment
 Not on filedocumented as of this encounter
 
 Visit Diagnoses
 Not on filedocumented in this encounter

## 2020-08-19 NOTE — XMS
Encounter Summary
  Created on: 2020
 
 Kassie Duckworth
 External Reference #: [id number]
 : 52
 Sex: Female
 
 Demographics
 
 
+-----------------------+------------------------+
| Address               | [address]         |
|                       | [place]  [address]    |
+-----------------------+------------------------+
| Home Phone            | [phone]        |
+-----------------------+------------------------+
| Preferred Language    | Unknown                |
+-----------------------+------------------------+
| Marital Status        |                 |
+-----------------------+------------------------+
| Caodaism Affiliation | Unknown                |
+-----------------------+------------------------+
| Race                  | White                  |
+-----------------------+------------------------+
| Ethnic Group          | Not  or  |
+-----------------------+------------------------+
 
 
 Author
 
 
+--------------+------------------------------+
| Author       | [facility] |
+--------------+------------------------------+
| Organization | [facility] |
+--------------+------------------------------+
| Address      | Unknown                      |
+--------------+------------------------------+
| Phone        | Unavailable                  |
+--------------+------------------------------+
 
 
 
 Support
 
 
+-------------+--------------+---------+-----------------+
| Name        | Relationship | Address | Phone           |
+-------------+--------------+---------+-----------------+
| Abisai Duckworth | ECON         | Unknown | [phone] |
+-------------+--------------+---------+-----------------+
 
 
 
 Care Team Providers
 
 
 
+-----------------------+------+-------------+
| Care Team Member Name | Role | Phone       |
+-----------------------+------+-------------+
 PCP  | Unavailable |
+-----------------------+------+-------------+
 
 
 
 Encounter Details
 
 
+--------+-------------+-------------------+----------------------+---------------+
| Date   | Type        | Department        | Care Team            | Description   |
+--------+-------------+-------------------+----------------------+---------------+
| / | Office      |   [facility] |   Note, Dermatology  | Progress Note |
| 2005   | Visit-Trans |                   | Clinic               |               |
|        | cribed      |                   |                      |               |
+--------+-------------+-------------------+----------------------+---------------+
 
 
 
 Social History
 
 
+----------------+-------+-----------+--------+------+
| Tobacco Use    | Types | Packs/Day | Years  | Date |
|                |       |           | Used   |      |
+----------------+-------+-----------+--------+------+
| Never Assessed |       |           |        |      |
+----------------+-------+-----------+--------+------+
 
 
 
+------------------+---------------+
| Sex Assigned at  | Date Recorded |
| Birth            |               |
+------------------+---------------+
| Not on file      |               |
+------------------+---------------+
 
 
 
+----------------+-------------+-------------+
| Job Start Date | Occupation  | Industry    |
+----------------+-------------+-------------+
| Not on file    | Not on file | Not on file |
+----------------+-------------+-------------+
 
 
 
+----------------+--------------+------------+
| Travel History | Travel Start | Travel End |
+----------------+--------------+------------+
 
 
 
+-------------------------------------+
| No recent travel history available. |
+-------------------------------------+
 documented as of this encounter
 
 
 Progress Notes
 Interface, Transcription In - 2005  1:10 AM PDT
      [id number]           [id number]
         [id number]  MADAN CONTE
 
 DATE OF SERVICE:                 2005
 
 SURGEON: Feliz Kumar M.D.
 SURGICAL ASSISTANT: Laura Corcoran M.D.
 RESIDENT: Eusebio Jiménez M.D.
 
 ASSESSMENT: Taken and Recorded
 
 INDICATIONS: Patient presents with primary basal cell carcinoma of the left
 nose sidewall, pretreatment lesion size .8 x .8 cm. Because of the size,
 site, aggressive pathology, poorly defined borders, and diagnosis of the
 lesion, it is felt that it is best treated with Mohs micrographic surgery.
 Alternative surgical and nonsurgical therapies were discussed with the
 patient prior to obtaining written and verbal consent for Mohs surgery
 after explanation of risks of bleeding, infection, scarring, nerve damage
 and recurrence
 
 STAGE 1: The patient was placed supine on the operating room table. The
 wound was defined and infiltrated with lidocaine with epinephrine. The area
 was then debulked. Initial excisions were made around the clinical and
 debulk markings and hemostasis was obtained by electrodessication. A
 dressing was placed. Tissue was divided into 2 specimens, which were
 mapped, color-coded at their margins, and frozen sectioning was performed.
 Microscopic tumor was found persisting in 1 of the specimens. The histology
 showed a large atypical basaloid pleomorphic cells extending into the
 papillary and reticular dermis.
 
 STAGE II: The patient was returned to the operative suite. The area of
 positivity was delineated, infiltrated with lidocaine with epinephrine, and
 excised. Tissue was divided into 1 specimens, which were again marked,
 color-coded, and frozen sectioning was performed. Hemostasis was obtained
 in the usual manner, and a dressing placed. Microscopic tumors were found
 persisting in none of the specimens.
 
 With the lesion clear of microscopic tumor, surgery was considered
 complete. Following surgery, the defect measures as follows: 1.5 x 1.2 cm.
 
 FINAL DIAGNOSIS: Primary basal cell carcinoma of the left nose sidewall.
 
 CONDITION AT TERMINATION OF THERAPY: Carcinoma removed.
 
 COMMENTS: Advancement flap closure.
 
 ______________________________________
 _______________________________________
 ___                                    _
 Feliz Kumar M.D.                      Laura Corcoran M.D.
 Professor and Chair, Dermatology        Fellow, Dermatology
                                         Eusebio Jiménez M.D.
                                         Resident, Dermatology
 
 Pursuant to federal Medicare billing regulations, I certify that I was
 present, at a minimum, during the key components of the procedure: identify
 location/lesion and method of destruction. Identify the appropriate margin,
 
 respect for anatomy and reading, and prepared histologic session.
 
 Abran
 D: 2005
 T: 2005  9:57 A
 
 Electronically signed by Interface, Transcription In at 2005  1:10 AM PDTdocumented i
n this encounter
 
 Plan of Treatment
 Not on filedocumented as of this encounter
 
 Visit Diagnoses
 Not on filedocumented in this encounter

## 2020-08-19 NOTE — XMS
Encounter Summary
  Created on: 2020
 
 Kassie Duckworth
 External Reference #: [id number]
 : 52
 Sex: Female
 
 Demographics
 
 
+-----------------------+----------------------+
| Address               | [address]    |
|                       | [place]  [address] |
+-----------------------+----------------------+
| Home Phone            | [phone]      |
+-----------------------+----------------------+
| Preferred Language    | Unknown              |
+-----------------------+----------------------+
| Marital Status        |               |
+-----------------------+----------------------+
| Jainism Affiliation | 1077                 |
+-----------------------+----------------------+
| Race                  | Unknown              |
+-----------------------+----------------------+
| Ethnic Group          | Unknown              |
+-----------------------+----------------------+
 
 
 Author
 
 
+--------------+--------------------------------------------+
| Author       | [facility]  |
|              | and [place]                                |
+--------------+--------------------------------------------+
| Organization | [facility]  |
|              | and [place]                                |
+--------------+--------------------------------------------+
| Address      | Unknown                                    |
+--------------+--------------------------------------------+
| Phone        | Unavailable                                |
+--------------+--------------------------------------------+
 
 
 
 Support
 
 
+---------------+--------------+---------+-----------------+
| Name          | Relationship | Address | Phone           |
+---------------+--------------+---------+-----------------+
| Abisai Duckworth | ECON         | Unknown | [phone] |
+---------------+--------------+---------+-----------------+
 
 
 
 Care Team Providers
 
 
 
+-----------------------+------+-------------+
| Care Team Member Name | Role | Phone       |
+-----------------------+------+-------------+
 PCP  | Unavailable |
+-----------------------+------+-------------+
 
 
 
 Encounter Details
 
 
+--------+-----------+----------------------+-----------+-------------+
| Date   | Type      | Department           | Care Team | Description |
+--------+-----------+----------------------+-----------+-------------+
| / | Hospital  |   [facility] |           |             |
|    | Encounter |  [facility] |           |             |
|        |           |   [address]       |           |             |
|        |           | [place]      |           |             |
|        |           | [address]           |           |             |
|        |           | [phone]         |           |             |
+--------+-----------+----------------------+-----------+-------------+
 
 
 
 Social History
 
 
+----------------+-------+-----------+--------+------+
| Tobacco Use    | Types | Packs/Day | Years  | Date |
|                |       |           | Used   |      |
+----------------+-------+-----------+--------+------+
| Never Assessed |       |           |        |      |
+----------------+-------+-----------+--------+------+
 
 
 
+------------------+---------------+
| Sex Assigned at  | Date Recorded |
| Birth            |               |
+------------------+---------------+
| Not on file      |               |
+------------------+---------------+
 documented as of this encounter
 
 Plan of Treatment
 Not on filedocumented as of this encounter
 
 Visit Diagnoses
 Not on filedocumented in this encounter

## 2020-08-19 NOTE — XMS
Encounter Summary
  Created on: 2020
 
 Kassie Duckworth
 External Reference #: [id number]
 : 52
 Sex: Female
 
 Demographics
 
 
+-----------------------+------------------------+
| Address               | [address]         |
|                       | [place]  [address]    |
+-----------------------+------------------------+
| Home Phone            | [phone]        |
+-----------------------+------------------------+
| Preferred Language    | Unknown                |
+-----------------------+------------------------+
| Marital Status        |                 |
+-----------------------+------------------------+
| Holiness Affiliation | Unknown                |
+-----------------------+------------------------+
| Race                  | White                  |
+-----------------------+------------------------+
| Ethnic Group          | Not  or  |
+-----------------------+------------------------+
 
 
 Author
 
 
+--------------+------------------------------+
| Author       | [facility] |
+--------------+------------------------------+
| Organization | [facility] |
+--------------+------------------------------+
| Address      | Unknown                      |
+--------------+------------------------------+
| Phone        | Unavailable                  |
+--------------+------------------------------+
 
 
 
 Support
 
 
+-------------+--------------+---------+-----------------+
| Name        | Relationship | Address | Phone           |
+-------------+--------------+---------+-----------------+
| Abisai Duckworth | ECON         | Unknown | [phone] |
+-------------+--------------+---------+-----------------+
 
 
 
 Care Team Providers
 
 
 
+-----------------------+------+-------------+
| Care Team Member Name | Role | Phone       |
+-----------------------+------+-------------+
 PCP  | Unavailable |
+-----------------------+------+-------------+
 
 
 
 Encounter Details
 
 
+--------+-------------+-------------------+-------------+-------------+
| Date   | Type        | Department        | Care Team   | Description |
+--------+-------------+-------------------+-------------+-------------+
| / | [facility] |   [facility] |   Letter,   | Letters     |
|    | scribed     |                   | Dermatology |             |
+--------+-------------+-------------------+-------------+-------------+
 
 
 
 Social History
 
 
+----------------+-------+-----------+--------+------+
| Tobacco Use    | Types | Packs/Day | Years  | Date |
|                |       |           | Used   |      |
+----------------+-------+-----------+--------+------+
| Never Assessed |       |           |        |      |
+----------------+-------+-----------+--------+------+
 
 
 
+------------------+---------------+
| Sex Assigned at  | Date Recorded |
| Birth            |               |
+------------------+---------------+
| Not on file      |               |
+------------------+---------------+
 
 
 
+----------------+-------------+-------------+
| Job Start Date | Occupation  | Industry    |
+----------------+-------------+-------------+
| Not on file    | Not on file | Not on file |
+----------------+-------------+-------------+
 
 
 
+----------------+--------------+------------+
| Travel History | Travel Start | Travel End |
+----------------+--------------+------------+
 
 
 
+-------------------------------------+
| No recent travel history available. |
+-------------------------------------+
 documented as of this encounter
 
 
 Progress Notes
 Interface, Transcription In - 2005  1:10 AM PDT
      [id number]           2004                     [id number]
         [id number]  MADAN CONTE
 
                     2005
 
                     Yony Arias M.D.
                     [address] [place] [address]  [id number]
 
                     Re: Kassie Duckworth
                      [id number]
 
                     Dear Dr. Arias,
 
                     Thank you for referring Kassie Nelson regarding the
                     primary basal cell carcinoma located on her left nose
                     sidewall. The tumor has now been removed using Mohs
                     micrographic surgery. The resultant defect was repaired
                     using an advancement flap closure. Copies of our
                     operative reports are enclosed for your records. They
                     should be self-explanatory. The patient will be
                     returning in one week for suture removal and surgical
                     follow up.
 
                     Sincerely,
 
                     Feliz Kumar M.D.
                     Professor and Chair, Dermatology
 
 NS/cary
 D: 2005
 T: 2005 10:08 A
 
 Electronically signed by Interface, Transcription In at 2005  1:10 AM PDTdocumented i
n this encounter
 
 Plan of Treatment
 Not on filedocumented as of this encounter
 
 Visit Diagnoses
 Not on filedocumented in this encounter

## 2020-08-19 NOTE — XMS
Encounter Summary
  Created on: 2020
 
 Kassie Duckworth
 External Reference #: [id number]
 : 52
 Sex: Female
 
 Demographics
 
 
+-----------------------+------------------------+
| Address               | [address]         |
|                       | [place]  [address]    |
+-----------------------+------------------------+
| Home Phone            | [phone]        |
+-----------------------+------------------------+
| Preferred Language    | Unknown                |
+-----------------------+------------------------+
| Marital Status        |                 |
+-----------------------+------------------------+
| Holiness Affiliation | Unknown                |
+-----------------------+------------------------+
| Race                  | White                  |
+-----------------------+------------------------+
| Ethnic Group          | Not  or  |
+-----------------------+------------------------+
 
 
 Author
 
 
+--------------+------------------------------+
| Author       | [facility] |
+--------------+------------------------------+
| Organization | [facility] |
+--------------+------------------------------+
| Address      | Unknown                      |
+--------------+------------------------------+
| Phone        | Unavailable                  |
+--------------+------------------------------+
 
 
 
 Support
 
 
+-------------+--------------+---------+-----------------+
| Name        | Relationship | Address | Phone           |
+-------------+--------------+---------+-----------------+
| Absiai Duckworth | ECON         | Unknown | [phone] |
+-------------+--------------+---------+-----------------+
 
 
 
 Care Team Providers
 
 
 
+-----------------------+------+-------------+
| Care Team Member Name | Role | Phone       |
+-----------------------+------+-------------+
 PCP  | Unavailable |
+-----------------------+------+-------------+
 
 
 
 Encounter Details
 
 
+--------+-------------+-------------------+----------------------+---------------+
| Date   | Type        | Department        | Care Team            | Description   |
+--------+-------------+-------------------+----------------------+---------------+
| / | Office      |   [facility] |   Note, Dermatology  | Progress Note |
| 2005   | Visit-Trans |                   | Clinic               |               |
|        | cribed      |                   |                      |               |
+--------+-------------+-------------------+----------------------+---------------+
 
 
 
 Social History
 
 
+----------------+-------+-----------+--------+------+
| Tobacco Use    | Types | Packs/Day | Years  | Date |
|                |       |           | Used   |      |
+----------------+-------+-----------+--------+------+
| Never Assessed |       |           |        |      |
+----------------+-------+-----------+--------+------+
 
 
 
+------------------+---------------+
| Sex Assigned at  | Date Recorded |
| Birth            |               |
+------------------+---------------+
| Not on file      |               |
+------------------+---------------+
 
 
 
+----------------+-------------+-------------+
| Job Start Date | Occupation  | Industry    |
+----------------+-------------+-------------+
| Not on file    | Not on file | Not on file |
+----------------+-------------+-------------+
 
 
 
+----------------+--------------+------------+
| Travel History | Travel Start | Travel End |
+----------------+--------------+------------+
 
 
 
+-------------------------------------+
| No recent travel history available. |
+-------------------------------------+
 documented as of this encounter
 
 
 Progress Notes
 Interface, Transcription In - 2005  5:06 AM PDT
      [id number]           2005 [id number]
         [id number]  MADAN CONTE
 
 DATE OF SERVICE:                 2005
 
 SURGEON: Feliz Kumar M.D.
 SURGICAL ASSISTANT: Laura Corcoran M.D.
 RESIDENT: Eusebio Jiménez M.D.
 
 ASSESSMENT: Taken and Recorded
 
 INDICATIONS: Patient presents with primary basal cell carcinoma of the left
 nose sidewall, pretreatment lesion size .8 x .8 cm. Because of the size,
 site, aggressive pathology, poorly defined borders, and diagnosis of the
 lesion, it is felt that it is best treated with Mohs micrographic surgery.
 Alternative surgical and nonsurgical therapies were discussed with the
 patient prior to obtaining written and verbal consent for Mohs surgery
 after explanation of risks of bleeding, infection, scarring, nerve damage
 and recurrence
 
 STAGE 1: The patient was placed supine on the operating room table. The
 wound was defined and infiltrated with lidocaine with epinephrine. The area
 was then debulked. Initial excisions were made around the clinical and
 debulk markings and hemostasis was obtained by electrodessication. A
 dressing was placed. Tissue was divided into 2 specimens, which were
 mapped, color-coded at their margins, and frozen sectioning was performed.
 Microscopic tumor was found persisting in 1 of the specimens. The histology
 showed a large atypical basaloid pleomorphic cells extending into the
 papillary and reticular dermis.
 
 STAGE II: The patient was returned to the operative suite. The area of
 positivity was delineated, infiltrated with lidocaine with epinephrine, and
 excised. Tissue was divided into 1 specimens, which were again marked,
 color-coded, and frozen sectioning was performed. Hemostasis was obtained
 in the usual manner, and a dressing placed. Microscopic tumors were found
 persisting in none of the specimens.
 
 With the lesion clear of microscopic tumor, surgery was considered
 complete. Following surgery, the defect measures as follows: 1.5 x 1.2 cm.
 
 FINAL DIAGNOSIS: Primary basal cell carcinoma of the left nose sidewall.
 
 CONDITION AT TERMINATION OF THERAPY: Carcinoma removed.
 
 COMMENTS: Advancement flap closure.
 
 ______________________________________
 _______________________________________
 ___                                    _
 Feliz Kumar M.D.                      Laura Corcoran M.D.
 Professor and Chair, Dermatology        Fellow, Dermatology
                                         Eusebio Jiménez M.D.
                                         Resident, Dermatology
 
 Pursuant to federal Medicare billing regulations, I certify that I was
 present, at a minimum, during the key components of the procedure: identify
 location/lesion and method of destruction. Identify the appropriate margin,
 
 respect for anatomy and reading, and prepared histologic session.
 
 NS/cary
 D: 2005
 T: 2005  9:57 A
 
 Electronically signed by Feliz Kumar 2005 02:45:28 PM
 Electronically signed by Paul Transcription In at 2005  5:06 AM PDTdocumented i
n this encounter
 
 Plan of Treatment
 Not on filedocumented as of this encounter
 
 Visit Diagnoses
 Not on filedocumented in this encounter

## 2020-08-19 NOTE — XMS
Encounter Summary
  Created on: 2020
 
 Kassie Duckworth
 External Reference #: [id number]
 : 52
 Sex: Female
 
 Demographics
 
 
+-----------------------+------------------------+
| Address               | [address]         |
|                       | [place]  [address]    |
+-----------------------+------------------------+
| Home Phone            | [phone]        |
+-----------------------+------------------------+
| Preferred Language    | Unknown                |
+-----------------------+------------------------+
| Marital Status        |                 |
+-----------------------+------------------------+
| Zoroastrian Affiliation | Unknown                |
+-----------------------+------------------------+
| Race                  | White                  |
+-----------------------+------------------------+
| Ethnic Group          | Not  or  |
+-----------------------+------------------------+
 
 
 Author
 
 
+--------------+------------------------------+
| Author       | [facility] |
+--------------+------------------------------+
| Organization | [facility] |
+--------------+------------------------------+
| Address      | Unknown                      |
+--------------+------------------------------+
| Phone        | Unavailable                  |
+--------------+------------------------------+
 
 
 
 Support
 
 
+-------------+--------------+---------+-----------------+
| Name        | Relationship | Address | Phone           |
+-------------+--------------+---------+-----------------+
| Abisai Duckworth | ECON         | Unknown | [phone] |
+-------------+--------------+---------+-----------------+
 
 
 
 Care Team Providers
 
 
 
+-----------------------+------+-------------+
| Care Team Member Name | Role | Phone       |
+-----------------------+------+-------------+
 PCP  | Unavailable |
+-----------------------+------+-------------+
 
 
 
 Encounter Details
 
 
+--------+----------+----------------------+----------------------+-------------+
| Date   | Type     | Department           | Care Team            | Description |
+--------+----------+----------------------+----------------------+-------------+
| / | Results  |   Surgical           |   Feliz Kumar MD  |             |
|    | Only     | Dermatology  [address] |  [address] [facility]   |             |
|        |          |  [address]       | [address]  [address]        |             |
|        |          | Mailcode:OP06        | [place] [address]  |             |
|        |          | Outpatient Clinic    |  [phone]        |             |
|        |          | [facility], Room 4300  | [phone] (Fax)   |             |
|        |          |  [place], [place]        |                      |             |
|        |          | [address]           |                      |             |
|        |          | [phone]         |                      |             |
+--------+----------+----------------------+----------------------+-------------+
 
 
 
 Social History
 
 
+----------------+-------+-----------+--------+------+
| Tobacco Use    | Types | Packs/Day | Years  | Date |
|                |       |           | Used   |      |
+----------------+-------+-----------+--------+------+
| Never Assessed |       |           |        |      |
+----------------+-------+-----------+--------+------+
 
 
 
+------------------+---------------+
| Sex Assigned at  | Date Recorded |
| Birth            |               |
+------------------+---------------+
| Not on file      |               |
+------------------+---------------+
 
 
 
+----------------+-------------+-------------+
| Job Start Date | Occupation  | Industry    |
+----------------+-------------+-------------+
| Not on file    | Not on file | Not on file |
+----------------+-------------+-------------+
 
 
 
+----------------+--------------+------------+
| Travel History | Travel Start | Travel End |
+----------------+--------------+------------+
 
 
 
 
+-------------------------------------+
| No recent travel history available. |
+-------------------------------------+
 documented as of this encounter
 
 Plan of Treatment
 Not on filedocumented as of this encounter
 
 Procedures
 
 
+----------------------+--------+------------+----------------------+----------------------+
| Procedure Name       | Priori | Date/Time  | Associated Diagnosis | Comments             |
|                      | ty     |            |                      |                      |
+----------------------+--------+------------+----------------------+----------------------+
| DERMATOPATHOLOGY(CON | Routin | 2004 |                      |   Results for this   |
| SULT)                | e      |            |                      | procedure are in the |
|                      |        |            |                      |  results section.    |
+----------------------+--------+------------+----------------------+----------------------+
 documented in this encounter
 
 Results
 DERMATOPATHOLOGY(CONSULT) (2004)
 
+-------------+--------------------------+-----------+------------+--------------+
| Component   | Value                    | Ref Range | Performed  | Pathologist  |
|             |                          |           | At         | Signature    |
+-------------+--------------------------+-----------+------------+--------------+
| DERMATOPATH | SOURCE OF SPECIMEN:A     |           |            |              |
| (CONSULT)   | CONSULTATION CLINICAL    |           |            |              |
|             | DESCRIPTION:3mm punch,   |           |            |              |
|             | Lt. side nose; pearly    |           |            |              |
|             | papule; BCC Dear Feliz:   |           |            |              |
|             |   I agree with        |           |            |              |
|             | [place] regarding Kassie  |           |            |              |
|             | Duckworth's left nosebiopsy   |           |            |              |
|             | where there are          |           |            |              |
|             | aggregates of cells with |           |            |              |
|             |  hyperchromatic          |           |            |              |
|             | nuclei,scant cytoplasm   |           |            |              |
|             | and palisading of the    |           |            |              |
|             | peripheral nuclei.       |           |            |              |
|             | DIAGNOSIS:A (B):   BASAL |           |            |              |
|             |  CELL CARCINOMA The      |           |            |              |
|             | basal cell carcinoma     |           |            |              |
|             | extends closely to the   |           |            |              |
|             | surgical margins. Thank  |           |            |              |
|             | you for referring this   |           |            |              |
|             | consultation.1 slide     |           |            |              |
|             | [id number] returned to  |           |            |              |
|             | Dr. Kumar. CRW:mm cc:  |           |            |              |
|             |   Yony Arias,     |           |            |              |
|             | [facility] |           |            |              |
|             |  Clinic         [address]   |           |            |              |
|             | [address], [address]        |           |            |              |
|             |   [place]        |           |            |              |
|             |   [id number]                  |           |            |              |
|             | 04Rendering        |           |            |              |
 
|             | Diagnostician:   Claudio |           |            |              |
|             |  ASHA Rivera Jr.,           |           |            |              |
|             | M.D.PathologistElectroni |           |            |              |
|             | gaby Signed             |           |            |              |
|             | 2005Comment:       |           |            |              |
|             | SOURCE OF SPECIMEN:      |           |            |              |
|             | CONSULTATION             |           |            |              |
+-------------+--------------------------+-----------+------------+--------------+
 
 
 
+----------+
| Specimen |
+----------+
|          |
+----------+
 
 
 
+------------------+--------------------------+----------------------+--------------+
| Performing       | Address                  | City/State/Zipcode   | Phone Number |
| Organization     |                          |                      |              |
+------------------+--------------------------+----------------------+--------------+
|   [facility]           |   Mailcode [id number]  [address]  |   [place] [address] |              |
| DERMATOPATHOLOGY | [address]              |                      |              |
+------------------+--------------------------+----------------------+--------------+
 documented in this encounter
 
 Visit Diagnoses
 Not on filedocumented in this encounter

## 2020-08-19 NOTE — XMS
Encounter Summary
  Created on: 2020
 
 Kassie Duckworth
 External Reference #: [id number]
 : 52
 Sex: Female
 
 Demographics
 
 
+-----------------------+------------------------+
| Address               | [address]         |
|                       | [place]  [id number]    |
+-----------------------+------------------------+
| Home Phone            | [phone]        |
+-----------------------+------------------------+
| Preferred Language    | Unknown                |
+-----------------------+------------------------+
| Marital Status        |                 |
+-----------------------+------------------------+
| Sikhism Affiliation | Unknown                |
+-----------------------+------------------------+
| Race                  | White                  |
+-----------------------+------------------------+
| Ethnic Group          | Not  or  |
+-----------------------+------------------------+
 
 
 Author
 
 
+--------------+------------------------------+
| Author       | [facility] |
+--------------+------------------------------+
| Organization | [facility] |
+--------------+------------------------------+
| Address      | Unknown                      |
+--------------+------------------------------+
| Phone        | Unavailable                  |
+--------------+------------------------------+
 
 
 
 Support
 
 
+-------------+--------------+---------+-----------------+
| Name        | Relationship | Address | Phone           |
+-------------+--------------+---------+-----------------+
| Abisai Duckworth | ECON         | Unknown | [phone] |
+-------------+--------------+---------+-----------------+
 
 
 
 Care Team Providers
 
 
 
+-----------------------+------+-------------+
| Care Team Member Name | Role | Phone       |
+-----------------------+------+-------------+
 PCP  | Unavailable |
+-----------------------+------+-------------+
 
 
 
 Encounter Details
 
 
+--------+-------------+-------------------+----------------------+---------------+
| Date   | Type        | Department        | Care Team            | Description   |
+--------+-------------+-------------------+----------------------+---------------+
| / | Office      |   [facility] |   Note, Dermatology  | Progress Note |
| 2005   | Visit-Trans |                   | Clinic               |               |
|        | cribed      |                   |                      |               |
+--------+-------------+-------------------+----------------------+---------------+
 
 
 
 Social History
 
 
+----------------+-------+-----------+--------+------+
| Tobacco Use    | Types | Packs/Day | Years  | Date |
|                |       |           | Used   |      |
+----------------+-------+-----------+--------+------+
| Never Assessed |       |           |        |      |
+----------------+-------+-----------+--------+------+
 
 
 
+------------------+---------------+
| Sex Assigned at  | Date Recorded |
| Birth            |               |
+------------------+---------------+
| Not on file      |               |
+------------------+---------------+
 
 
 
+----------------+-------------+-------------+
| Job Start Date | Occupation  | Industry    |
+----------------+-------------+-------------+
| Not on file    | Not on file | Not on file |
+----------------+-------------+-------------+
 
 
 
+----------------+--------------+------------+
| Travel History | Travel Start | Travel End |
+----------------+--------------+------------+
 
 
 
+-------------------------------------+
| No recent travel history available. |
+-------------------------------------+
 documented as of this encounter
 
 
 Progress Notes
 Interface, Transcription In - 2005  5:06 AM PDT
      [id number]           2005 [id number]
         [id number]  MADAN CONTE
 
 DATE OF SERVICE:                 2005
 
 SURGEON:  Feliz Kumar M.D.
 SURGICAL ASSISTANT:  Laura Corcoran M.D.
 RESIDENT:   Eusebio Jiménez M.D.
 
 INDICATIONS:  The patient was left with a 1.5 x 1.2 cm defect involving the
 left nose sidewall following Mohs surgery for removal of a primary basal
 cell carcinoma. Various closure modalities were discussed with the patient,
 and it was decided that an advancement flap would best preserve normal
 anatomical and functional relationships. After a discussion of the risks of
 bleeding, scarring, infection, and wound dehiscense, written and verbal
 consent was obtained, and the patient underwent the procedure as follows.
 
 PROCEDURE:  The patient was taken to the operative suite and placed supine
 on the operating room table. The area was anesthetized with 1% lidocaine
 with epinephrine. The area was washed with Hibiclens, rinsed with saline,
 and draped with sterile towels. An advancement flap was designed with
 incisions made superiorly and laterally. The flap was widely undermined,
 and hemostasis was obtained with spot electrodessication. The flap was
 advanced to close the primary defect and sutured using fascia and 5-0
 polysorb sutures. The secondary defect was modified by tissue rearrangement
 removing Burrow's triangles. Once aligned, the dermis was carefully closed
 using 5-0 polysorb dermal sutures. The epidermis was carefully approximated
 throughout the length of the wound with 5-0 novafil and fast absorbing gut
 sutures. The final wound length was 3.0 x 2.0cm. A sterile pressure
 dressing was applied, and wound care instructions were given.
 
 FINAL DIAGNOSIS:  Primary basal cell carcinoma of the left nose sidewall.
 
 FINAL PROCEDURE: Advancement flap.
 
 COMPLICATIONS:  None.
 
 ______________________________________
 _______________________________________
 ___                                    _
 Feliz Kumar M.D.                      Laura Corcoran M.D.
 Professor and Chair, Dermatology        Fellow, Dermatology
                                         Eusebio Jiménez M.D.
                                         Resident, Dermatology
 
 Pursuant to federal Medicare billing regulations, I certify that I was
 present, at a minimum, during the key components of the procedure: identify
 type of reconstruction, area of primary and secondary tissue movement,
 design of flap, location of key stitch, and review of final result.
 
 NS/cary
 D: 2005
 T: 2005 10:04 A
 
 Electronically signed by Feliz Kumar 2005 02:45:39 PM
 Electronically signed by Paul, Transcription In at 2005  5:06 AM PDTdocumented i
n this encounter
 
 
 Plan of Treatment
 Not on filedocumented as of this encounter
 
 Visit Diagnoses
 Not on filedocumented in this encounter

## 2020-08-19 NOTE — XMS
Encounter Summary
  Created on: 2020
 
 Kassie Duckworth
 External Reference #: [id number]
 : 52
 Sex: Female
 
 Demographics
 
 
+-----------------------+------------------------+
| Address               | [address]         |
|                       | [place]  [address]    |
+-----------------------+------------------------+
| Home Phone            | [phone]        |
+-----------------------+------------------------+
| Preferred Language    | Unknown                |
+-----------------------+------------------------+
| Marital Status        |                 |
+-----------------------+------------------------+
| Jainism Affiliation | Unknown                |
+-----------------------+------------------------+
| Race                  | White                  |
+-----------------------+------------------------+
| Ethnic Group          | Not  or  |
+-----------------------+------------------------+
 
 
 Author
 
 
+--------------+------------------------------+
| Author       | [facility] |
+--------------+------------------------------+
| Organization | [facility] |
+--------------+------------------------------+
| Address      | Unknown                      |
+--------------+------------------------------+
| Phone        | Unavailable                  |
+--------------+------------------------------+
 
 
 
 Support
 
 
+-------------+--------------+---------+-----------------+
| Name        | Relationship | Address | Phone           |
+-------------+--------------+---------+-----------------+
| Abisai Duckworth | ECON         | Unknown | [phone] |
+-------------+--------------+---------+-----------------+
 
 
 
 Care Team Providers
 
 
 
+-----------------------+------+-------------+
| Care Team Member Name | Role | Phone       |
+-----------------------+------+-------------+
 PCP  | Unavailable |
+-----------------------+------+-------------+
 
 
 
 Encounter Details
 
 
+--------+-------------+-------------------+----------------------+---------------+
| Date   | Type        | Department        | Care Team            | Description   |
+--------+-------------+-------------------+----------------------+---------------+
| / | Office      |   [facility] |   Note, Dermatology  | Progress Note |
| 2005   | Visit-Trans |                   | Clinic               |               |
|        | cribed      |                   |                      |               |
+--------+-------------+-------------------+----------------------+---------------+
 
 
 
 Social History
 
 
+----------------+-------+-----------+--------+------+
| Tobacco Use    | Types | Packs/Day | Years  | Date |
|                |       |           | Used   |      |
+----------------+-------+-----------+--------+------+
| Never Assessed |       |           |        |      |
+----------------+-------+-----------+--------+------+
 
 
 
+------------------+---------------+
| Sex Assigned at  | Date Recorded |
| Birth            |               |
+------------------+---------------+
| Not on file      |               |
+------------------+---------------+
 
 
 
+----------------+-------------+-------------+
| Job Start Date | Occupation  | Industry    |
+----------------+-------------+-------------+
| Not on file    | Not on file | Not on file |
+----------------+-------------+-------------+
 
 
 
+----------------+--------------+------------+
| Travel History | Travel Start | Travel End |
+----------------+--------------+------------+
 
 
 
+-------------------------------------+
| No recent travel history available. |
+-------------------------------------+
 documented as of this encounter
 
 
 Progress Notes
 Interface, Transcription In - 2005  1:10 AM [facility]
      [id number]           2005 [id number]
         [id number]  MADAN CONTE
 
 DATE OF SERVICE:                 2005
 
 SURGEON:  Feliz Kumar M.D.
 SURGICAL ASSISTANT:  Laura Corcoran M.D.
 RESIDENT:   Eusebio Jiménez M.D.
 
 INDICATIONS:  The patient was left with a 1.5 x 1.2 cm defect involving the
 left nose sidewall following Mohs surgery for removal of a primary basal
 cell carcinoma. Various closure modalities were discussed with the patient,
 and it was decided that an advancement flap would best preserve normal
 anatomical and functional relationships. After a discussion of the risks of
 bleeding, scarring, infection, and wound dehiscense, written and verbal
 consent was obtained, and the patient underwent the procedure as follows.
 
 PROCEDURE:  The patient was taken to the operative suite and placed supine
 on the operating room table. The area was anesthetized with 1% lidocaine
 with epinephrine. The area was washed with Hibiclens, rinsed with saline,
 and draped with sterile towels. An advancement flap was designed with
 incisions made superiorly and laterally. The flap was widely undermined,
 and hemostasis was obtained with spot electrodessication. The flap was
 advanced to close the primary defect and sutured using fascia and 5-0
 polysorb sutures. The secondary defect was modified by tissue rearrangement
 removing Burrow's triangles. Once aligned, the dermis was carefully closed
 using 5-0 polysorb dermal sutures. The epidermis was carefully approximated
 throughout the length of the wound with 5-0 novafil and fast absorbing gut
 sutures. The final wound length was 3.0 x 2.0cm. A sterile pressure
 dressing was applied, and wound care instructions were given.
 
 FINAL DIAGNOSIS:  Primary basal cell carcinoma of the left nose sidewall.
 
 FINAL PROCEDURE: Advancement flap.
 
 COMPLICATIONS:  None.
 
 ______________________________________
 _______________________________________
 ___                                    _
 BRAYAN Robles M.D.
 Professor and Chair, Dermatology        Fellow, Dermatology
                                         Eusebio Jiménez M.D.
                                         Resident, Dermatology
 
 Pursuant to federal Medicare billing regulations, I certify that I was
 present, at a minimum, during the key components of the procedure: identify
 type of reconstruction, area of primary and secondary tissue movement,
 design of flap, location of key stitch, and review of final result.
 
 NS/cary
 D: 2005
 T: 2005 10:04 A
 
 Electronically signed by Interface, Transcription In at 2005  1:10 AM PDTdocumented i
n this encounter
 
 
 Plan of Treatment
 Not on filedocumented as of this encounter
 
 Visit Diagnoses
 Not on filedocumented in this encounter

## 2020-08-19 NOTE — XMS
Encounter Summary
  Created on: 2020
 
 Kassie Duckworth
 External Reference #: [id number]
 : 52
 Sex: Female
 
 Demographics
 
 
+-----------------------+------------------------+
| Address               | [address]         |
|                       | [place]  [address]    |
+-----------------------+------------------------+
| Home Phone            | [phone]        |
+-----------------------+------------------------+
| Preferred Language    | Unknown                |
+-----------------------+------------------------+
| Marital Status        |                 |
+-----------------------+------------------------+
| Hinduism Affiliation | Unknown                |
+-----------------------+------------------------+
| Race                  | White                  |
+-----------------------+------------------------+
| Ethnic Group          | Not  or  |
+-----------------------+------------------------+
 
 
 Author
 
 
+--------------+------------------------------+
| Author       | [facility] |
+--------------+------------------------------+
| Organization | [facility] |
+--------------+------------------------------+
| Address      | Unknown                      |
+--------------+------------------------------+
| Phone        | Unavailable                  |
+--------------+------------------------------+
 
 
 
 Support
 
 
+-------------+--------------+---------+-----------------+
| Name        | Relationship | Address | Phone           |
+-------------+--------------+---------+-----------------+
| Abisai Duckworth | ECON         | Unknown | [phone] |
+-------------+--------------+---------+-----------------+
 
 
 
 Care Team Providers
 
 
 
+-----------------------+------+-------------+
| Care Team Member Name | Role | Phone       |
+-----------------------+------+-------------+
 PCP  | Unavailable |
+-----------------------+------+-------------+
 
 
 
 Encounter Details
 
 
+--------+-------------+-------------------+----------------------+---------------+
| Date   | Type        | Department        | Care Team            | Description   |
+--------+-------------+-------------------+----------------------+---------------+
| / | Office      |   [facility] |   Note, Dermatology  | Progress Note |
| 2005   | Visit-Trans |                   | Clinic               |               |
|        | cribed      |                   |                      |               |
+--------+-------------+-------------------+----------------------+---------------+
 
 
 
 Social History
 
 
+----------------+-------+-----------+--------+------+
| Tobacco Use    | Types | Packs/Day | Years  | Date |
|                |       |           | Used   |      |
+----------------+-------+-----------+--------+------+
| Never Assessed |       |           |        |      |
+----------------+-------+-----------+--------+------+
 
 
 
+------------------+---------------+
| Sex Assigned at  | Date Recorded |
| Birth            |               |
+------------------+---------------+
| Not on file      |               |
+------------------+---------------+
 
 
 
+----------------+-------------+-------------+
| Job Start Date | Occupation  | Industry    |
+----------------+-------------+-------------+
| Not on file    | Not on file | Not on file |
+----------------+-------------+-------------+
 
 
 
+----------------+--------------+------------+
| Travel History | Travel Start | Travel End |
+----------------+--------------+------------+
 
 
 
+-------------------------------------+
| No recent travel history available. |
+-------------------------------------+
 documented as of this encounter
 
 
 Progress Notes
 Interface, Transcription In - 2005  1:10 AM [facility]
      [id number]           2005 [id number]
         [id number]  MAD[facility] CONTE
 
 DATE OF SERVICE:                 2005
 
 SURGEON:  Feliz Kumar M.D.
 SURGICAL ASSISTANT:  Laura Corcoran M.D.
 RESIDENT:   Eusebio Jiménez M.D.
 
 INDICATIONS:  The patient was left with a 1.5 x 1.2 cm defect involving the
 left nose sidewall following Mohs surgery for removal of a primary basal
 cell carcinoma. Various closure modalities were discussed with the patient,
 and it was decided that an advancement flap would best preserve normal
 anatomical and functional relationships. After a discussion of the risks of
 bleeding, scarring, infection, and wound dehiscense, written and verbal
 consent was obtained, and the patient underwent the procedure as follows.
 
 PROCEDURE:  The patient was taken to the operative suite and placed supine
 on the operating room table. The area was anesthetized with 1% lidocaine
 with epinephrine. The area was washed with Hibiclens, rinsed with saline,
 and draped with sterile towels. An advancement flap was designed with
 incisions made superiorly and laterally. The flap was widely undermined,
 and hemostasis was obtained with spot electrodessication. The flap was
 advanced to close the primary defect and sutured using fascia and 5-0
 polysorb sutures. The secondary defect was modified by tissue rearrangement
 removing Burrow's triangles. Once aligned, the dermis was carefully closed
 using 5-0 polysorb dermal sutures. The epidermis was carefully approximated
 throughout the length of the wound with 5-0 novafil and fast absorbing gut
 sutures. The final wound length was 3.0 x 2.0cm. A sterile pressure
 dressing was applied, and wound care instructions were given.
 
 FINAL DIAGNOSIS:  Primary basal cell carcinoma of the left nose sidewall.
 
 FINAL PROCEDURE: Advancement flap.
 
 COMPLICATIONS:  None.
 
 ______________________________________
 _______________________________________
 ___                                    _
 BRAYAN Robles M.D.
 Professor and Chair, Dermatology        Fellow, Dermatology
                                         Eusebio Jiménez M.D.
                                         Resident, Dermatology
 
 Pursuant to federal Medicare billing regulations, I certify that I was
 present, at a minimum, during the key components of the procedure: identify
 type of reconstruction, area of primary and secondary tissue movement,
 design of flap, location of key stitch, and review of final result.
 
 NS/cary
 D: 2005
 T: 2005 10:04 A
 
 Electronically signed by Interface, Transcription In at 2005  1:10 AM PDTdocumented i
n this encounter
 
 
 Plan of Treatment
 Not on filedocumented as of this encounter
 
 Visit Diagnoses
 Not on filedocumented in this encounter

## 2020-08-19 NOTE — XMS
Encounter Summary
  Created on: 2020
 
 Kassie Duckworth
 External Reference #: [id number]
 : 52
 Sex: Female
 
 Demographics
 
 
+-----------------------+------------------------+
| Address               | [address]         |
|                       | [place]  [address]    |
+-----------------------+------------------------+
| Home Phone            | [phone]        |
+-----------------------+------------------------+
| Preferred Language    | Unknown                |
+-----------------------+------------------------+
| Marital Status        |                 |
+-----------------------+------------------------+
| Mormonism Affiliation | Unknown                |
+-----------------------+------------------------+
| Race                  | White                  |
+-----------------------+------------------------+
| Ethnic Group          | Not  or  |
+-----------------------+------------------------+
 
 
 Author
 
 
+--------------+------------------------------+
| Author       | [facility] |
+--------------+------------------------------+
| Organization | [facility] |
+--------------+------------------------------+
| Address      | Unknown                      |
+--------------+------------------------------+
| Phone        | Unavailable                  |
+--------------+------------------------------+
 
 
 
 Support
 
 
+-------------+--------------+---------+-----------------+
| Name        | Relationship | Address | Phone           |
+-------------+--------------+---------+-----------------+
| Abisai Duckworth | ECON         | Unknown | [phone] |
+-------------+--------------+---------+-----------------+
 
 
 
 Care Team Providers
 
 
 
+-----------------------+------+-------------+
| Care Team Member Name | Role | Phone       |
+-----------------------+------+-------------+
 PCP  | Unavailable |
+-----------------------+------+-------------+
 
 
 
 Encounter Details
 
 
+--------+-------------+-------------------+----------------------+---------------+
| Date   | Type        | Department        | Care Team            | Description   |
+--------+-------------+-------------------+----------------------+---------------+
| / | Office      |   [facility] |   Note, Dermatology  | Progress Note |
| 2005   | Visit-Trans |                   | Clinic               |               |
|        | cribed      |                   |                      |               |
+--------+-------------+-------------------+----------------------+---------------+
 
 
 
 Social History
 
 
+----------------+-------+-----------+--------+------+
| Tobacco Use    | Types | Packs/Day | Years  | Date |
|                |       |           | Used   |      |
+----------------+-------+-----------+--------+------+
| Never Assessed |       |           |        |      |
+----------------+-------+-----------+--------+------+
 
 
 
+------------------+---------------+
| Sex Assigned at  | Date Recorded |
| Birth            |               |
+------------------+---------------+
| Not on file      |               |
+------------------+---------------+
 
 
 
+----------------+-------------+-------------+
| Job Start Date | Occupation  | Industry    |
+----------------+-------------+-------------+
| Not on file    | Not on file | Not on file |
+----------------+-------------+-------------+
 
 
 
+----------------+--------------+------------+
| Travel History | Travel Start | Travel End |
+----------------+--------------+------------+
 
 
 
+-------------------------------------+
| No recent travel history available. |
+-------------------------------------+
 documented as of this encounter
 
 
 Progress Notes
 Interface, Transcription In - 2005  1:10 AM PDT
      [id number]           [id number]
         [id number]  MADAN CONTE
 
 DATE OF SERVICE:                 2005
 
 SURGEON: Feliz Kumar M.D.
 SURGICAL ASSISTANT: Laura Corcoran M.D.
 RESIDENT: Eusebio Jiménez M.D.
 
 ASSESSMENT: Taken and Recorded
 
 INDICATIONS: Patient presents with primary basal cell carcinoma of the left
 nose sidewall, pretreatment lesion size .8 x .8 cm. Because of the size,
 site, aggressive pathology, poorly defined borders, and diagnosis of the
 lesion, it is felt that it is best treated with Mohs micrographic surgery.
 Alternative surgical and nonsurgical therapies were discussed with the
 patient prior to obtaining written and verbal consent for Mohs surgery
 after explanation of risks of bleeding, infection, scarring, nerve damage
 and recurrence
 
 STAGE 1: The patient was placed supine on the operating room table. The
 wound was defined and infiltrated with lidocaine with epinephrine. The area
 was then debulked. Initial excisions were made around the clinical and
 debulk markings and hemostasis was obtained by electrodessication. A
 dressing was placed. Tissue was divided into 2 specimens, which were
 mapped, color-coded at their margins, and frozen sectioning was performed.
 Microscopic tumor was found persisting in 1 of the specimens. The histology
 showed a large atypical basaloid pleomorphic cells extending into the
 papillary and reticular dermis.
 
 STAGE II: The patient was returned to the operative suite. The area of
 positivity was delineated, infiltrated with lidocaine with epinephrine, and
 excised. Tissue was divided into 1 specimens, which were again marked,
 color-coded, and frozen sectioning was performed. Hemostasis was obtained
 in the usual manner, and a dressing placed. Microscopic tumors were found
 persisting in none of the specimens.
 
 With the lesion clear of microscopic tumor, surgery was considered
 complete. Following surgery, the defect measures as follows: 1.5 x 1.2 cm.
 
 FINAL DIAGNOSIS: Primary basal cell carcinoma of the left nose sidewall.
 
 CONDITION AT TERMINATION OF THERAPY: Carcinoma removed.
 
 COMMENTS: Advancement flap closure.
 
 ______________________________________
 _______________________________________
 ___                                    _
 Feliz Kumar M.D.                      Laura Corcoran M.D.
 Professor and Chair, Dermatology        Fellow, Dermatology
                                         Eusebio Jiménez M.D.
                                         Resident, Dermatology
 
 Pursuant to federal Medicare billing regulations, I certify that I was
 present, at a minimum, during the key components of the procedure: identify
 location/lesion and method of destruction. Identify the appropriate margin,
 
 respect for anatomy and reading, and prepared histologic session.
 
 Abran
 D: 2005
 T: 2005  9:57 A
 
 Electronically signed by Interface, Transcription In at 2005  1:10 AM PDTdocumented i
n this encounter
 
 Plan of Treatment
 Not on filedocumented as of this encounter
 
 Visit Diagnoses
 Not on filedocumented in this encounter